# Patient Record
Sex: FEMALE | Race: WHITE | NOT HISPANIC OR LATINO | Employment: OTHER | URBAN - METROPOLITAN AREA
[De-identification: names, ages, dates, MRNs, and addresses within clinical notes are randomized per-mention and may not be internally consistent; named-entity substitution may affect disease eponyms.]

---

## 2023-04-11 ENCOUNTER — TELEPHONE (OUTPATIENT)
Dept: PRIMARY CARE | Facility: CLINIC | Age: 88
End: 2023-04-11
Payer: MEDICARE

## 2023-04-11 NOTE — TELEPHONE ENCOUNTER
Please call son regarding a letter he would like sent to [facility] so she can get benefits from her spouse due to the fact she has hearing loss in one ear and loss of vision in one eye. Please call

## 2023-05-08 ENCOUNTER — OFFICE VISIT (OUTPATIENT)
Dept: PRIMARY CARE | Facility: CLINIC | Age: 88
End: 2023-05-08
Payer: MEDICARE

## 2023-05-08 VITALS
DIASTOLIC BLOOD PRESSURE: 70 MMHG | OXYGEN SATURATION: 96 % | BODY MASS INDEX: 21.53 KG/M2 | RESPIRATION RATE: 16 BRPM | SYSTOLIC BLOOD PRESSURE: 130 MMHG | WEIGHT: 113 LBS | HEART RATE: 86 BPM | TEMPERATURE: 97.9 F

## 2023-05-08 DIAGNOSIS — F32.0 CURRENT MILD EPISODE OF MAJOR DEPRESSIVE DISORDER, UNSPECIFIED WHETHER RECURRENT (CMS-HCC): ICD-10-CM

## 2023-05-08 DIAGNOSIS — M35.3 PMR (POLYMYALGIA RHEUMATICA) (MULTI): ICD-10-CM

## 2023-05-08 DIAGNOSIS — Z93.9 HISTORY OF CREATION OF OSTOMY (MULTI): ICD-10-CM

## 2023-05-08 DIAGNOSIS — R05.2 SUBACUTE COUGH: Primary | ICD-10-CM

## 2023-05-08 DIAGNOSIS — E55.9 VITAMIN D DEFICIENCY: ICD-10-CM

## 2023-05-08 DIAGNOSIS — K51.919 ULCERATIVE COLITIS WITH COMPLICATION, UNSPECIFIED LOCATION (MULTI): ICD-10-CM

## 2023-05-08 DIAGNOSIS — E21.3 HYPERPARATHYROIDISM (MULTI): ICD-10-CM

## 2023-05-08 DIAGNOSIS — R53.83 OTHER FATIGUE: ICD-10-CM

## 2023-05-08 PROBLEM — H91.90 HEARING LOSS: Status: ACTIVE | Noted: 2023-05-08

## 2023-05-08 PROBLEM — I48.91 ATRIAL FIBRILLATION (MULTI): Status: RESOLVED | Noted: 2023-05-08 | Resolved: 2023-05-08

## 2023-05-08 PROBLEM — E53.8 VITAMIN B12 DEFICIENCY: Status: ACTIVE | Noted: 2023-05-08

## 2023-05-08 PROBLEM — H52.13 MYOPIA OF BOTH EYES: Status: ACTIVE | Noted: 2023-05-08

## 2023-05-08 PROBLEM — H54.62 VISION LOSS, LEFT EYE: Status: ACTIVE | Noted: 2023-05-08

## 2023-05-08 PROBLEM — H27.112 SUBLUXATION OF LEFT LENS: Status: ACTIVE | Noted: 2023-05-08

## 2023-05-08 PROBLEM — H35.372 EPIRETINAL MEMBRANE (ERM) OF LEFT EYE: Status: ACTIVE | Noted: 2023-05-08

## 2023-05-08 PROBLEM — R93.0 OPACIFICATION OF SPHENOID SINUS: Status: ACTIVE | Noted: 2023-05-08

## 2023-05-08 PROBLEM — H33.002 RHEGMATOGENOUS RETINAL DETACHMENT OF LEFT EYE: Status: ACTIVE | Noted: 2023-05-08

## 2023-05-08 PROBLEM — H90.3 BILATERAL HIGH FREQUENCY SENSORINEURAL HEARING LOSS: Status: ACTIVE | Noted: 2023-05-08

## 2023-05-08 PROBLEM — M25.561 ARTHRALGIA OF BOTH LOWER LEGS: Status: ACTIVE | Noted: 2023-05-08

## 2023-05-08 PROBLEM — M81.0 OSTEOPOROSIS: Status: ACTIVE | Noted: 2023-05-08

## 2023-05-08 PROBLEM — G44.229 CHRONIC TENSION HEADACHES: Status: ACTIVE | Noted: 2023-05-08

## 2023-05-08 PROBLEM — M54.2 NECK PAIN: Status: ACTIVE | Noted: 2023-05-08

## 2023-05-08 PROBLEM — H40.9 GLAUCOMA: Status: ACTIVE | Noted: 2023-05-08

## 2023-05-08 PROBLEM — R29.6 FALL IN ELDERLY PATIENT: Status: ACTIVE | Noted: 2023-05-08

## 2023-05-08 PROBLEM — H18.519 FUCHS' ENDOTHELIAL DYSTROPHY: Status: ACTIVE | Noted: 2023-05-08

## 2023-05-08 PROBLEM — H35.342 MACULAR HOLE, LEFT EYE: Status: ACTIVE | Noted: 2023-05-08

## 2023-05-08 PROBLEM — R26.89 UNSTABLE BALANCE: Status: ACTIVE | Noted: 2023-05-08

## 2023-05-08 PROBLEM — H17.9 CORNEAL SCARS, BOTH EYES: Status: ACTIVE | Noted: 2023-05-08

## 2023-05-08 PROBLEM — H52.11 MYOPIA OF RIGHT EYE: Status: ACTIVE | Noted: 2023-05-08

## 2023-05-08 PROBLEM — H93.13 TINNITUS OF BOTH EARS: Status: ACTIVE | Noted: 2023-05-08

## 2023-05-08 PROBLEM — H16.223 KERATOCONJUNCTIVITIS SICCA OF BOTH EYES NOT DUE TO SJOGREN'S SYNDROME: Status: ACTIVE | Noted: 2023-05-08

## 2023-05-08 PROBLEM — M99.09 SEGMENTAL AND SOMATIC DYSFUNCTION: Status: ACTIVE | Noted: 2023-05-08

## 2023-05-08 PROBLEM — M19.90 OSTEOARTHRITIS: Status: ACTIVE | Noted: 2023-05-08

## 2023-05-08 PROBLEM — I48.91 ATRIAL FIBRILLATION (MULTI): Status: ACTIVE | Noted: 2023-05-08

## 2023-05-08 PROBLEM — H02.889 MGD (MEIBOMIAN GLAND DYSFUNCTION): Status: ACTIVE | Noted: 2023-05-08

## 2023-05-08 PROBLEM — F32.A DEPRESSION: Status: ACTIVE | Noted: 2023-05-08

## 2023-05-08 PROBLEM — H47.291 PARTIAL OPTIC ATROPHY OF RIGHT EYE: Status: ACTIVE | Noted: 2023-05-08

## 2023-05-08 PROBLEM — H04.123 DRY EYE SYNDROME OF BILATERAL LACRIMAL GLANDS: Status: ACTIVE | Noted: 2023-05-08

## 2023-05-08 PROBLEM — H90.3 ASYMMETRICAL SENSORINEURAL HEARING LOSS: Status: ACTIVE | Noted: 2023-05-08

## 2023-05-08 PROBLEM — M53.9 MULTILEVEL DEGENERATIVE DISC DISEASE: Status: ACTIVE | Noted: 2023-05-08

## 2023-05-08 PROBLEM — H43.811 PVD (POSTERIOR VITREOUS DETACHMENT), RIGHT EYE: Status: ACTIVE | Noted: 2023-05-08

## 2023-05-08 PROBLEM — E78.5 HYPERLIPIDEMIA: Status: ACTIVE | Noted: 2023-05-08

## 2023-05-08 PROBLEM — J30.9 ALLERGIC RHINITIS: Status: ACTIVE | Noted: 2023-05-08

## 2023-05-08 PROBLEM — Z93.2 ILEOSTOMY IN PLACE (MULTI): Status: ACTIVE | Noted: 2023-05-08

## 2023-05-08 PROBLEM — M25.562 ARTHRALGIA OF BOTH LOWER LEGS: Status: ACTIVE | Noted: 2023-05-08

## 2023-05-08 PROBLEM — Z96.1 PSEUDOPHAKIA OF BOTH EYES: Status: ACTIVE | Noted: 2023-05-08

## 2023-05-08 PROBLEM — H93.19 TINNITUS: Status: ACTIVE | Noted: 2023-05-08

## 2023-05-08 PROBLEM — R35.0 URINARY FREQUENCY: Status: ACTIVE | Noted: 2023-05-08

## 2023-05-08 PROBLEM — M47.812 SPONDYLOSIS OF CERVICAL REGION WITHOUT MYELOPATHY OR RADICULOPATHY: Status: ACTIVE | Noted: 2023-05-08

## 2023-05-08 PROBLEM — H01.019: Status: ACTIVE | Noted: 2023-05-08

## 2023-05-08 PROBLEM — H18.12 BULLOUS KERATOPATHY, LEFT EYE: Status: ACTIVE | Noted: 2023-05-08

## 2023-05-08 PROBLEM — H40.10X2 ADVANCED OPEN-ANGLE GLAUCOMA, MODERATE STAGE: Status: ACTIVE | Noted: 2023-05-08

## 2023-05-08 PROBLEM — M25.50 JOINT ACHE: Status: ACTIVE | Noted: 2023-05-08

## 2023-05-08 PROBLEM — K21.9 ESOPHAGEAL REFLUX: Status: ACTIVE | Noted: 2023-05-08

## 2023-05-08 PROBLEM — H02.88A MEIBOMIAN GLAND DYSFUNCTION (MGD) OF UPPER AND LOWER EYELID OF RIGHT EYE: Status: ACTIVE | Noted: 2023-05-08

## 2023-05-08 PROBLEM — E83.52 HYPERCALCEMIA: Status: ACTIVE | Noted: 2023-05-08

## 2023-05-08 PROBLEM — H18.451 SALZMANN'S NODULAR DYSTROPHY OF RIGHT EYE: Status: ACTIVE | Noted: 2023-05-08

## 2023-05-08 PROBLEM — H02.88B MEIBOMIAN GLAND DYSFUNCTION (MGD) OF UPPER AND LOWER EYELID OF LEFT EYE: Status: ACTIVE | Noted: 2023-05-08

## 2023-05-08 PROCEDURE — 1160F RVW MEDS BY RX/DR IN RCRD: CPT | Performed by: INTERNAL MEDICINE

## 2023-05-08 PROCEDURE — 99214 OFFICE O/P EST MOD 30 MIN: CPT | Performed by: INTERNAL MEDICINE

## 2023-05-08 PROCEDURE — 1159F MED LIST DOCD IN RCRD: CPT | Performed by: INTERNAL MEDICINE

## 2023-05-08 PROCEDURE — 1036F TOBACCO NON-USER: CPT | Performed by: INTERNAL MEDICINE

## 2023-05-08 PROCEDURE — 1157F ADVNC CARE PLAN IN RCRD: CPT | Performed by: INTERNAL MEDICINE

## 2023-05-08 RX ORDER — FLUTICASONE PROPIONATE 50 MCG
1 SPRAY, SUSPENSION (ML) NASAL 2 TIMES DAILY
COMMUNITY
Start: 2019-01-24

## 2023-05-08 RX ORDER — CALCIUM CARBONATE/VITAMIN D3 600 MG-10
1 TABLET ORAL DAILY
COMMUNITY

## 2023-05-08 RX ORDER — OMEPRAZOLE 20 MG/1
1 CAPSULE, DELAYED RELEASE ORAL DAILY
Status: ON HOLD | COMMUNITY
Start: 2022-02-09 | End: 2024-02-07 | Stop reason: ENTERED-IN-ERROR

## 2023-05-08 RX ORDER — ESCITALOPRAM OXALATE 10 MG/1
10 TABLET ORAL
COMMUNITY
Start: 2013-10-03 | End: 2023-12-01 | Stop reason: SDUPTHER

## 2023-05-08 RX ORDER — LANOLIN ALCOHOL/MO/W.PET/CERES
1 CREAM (GRAM) TOPICAL DAILY
COMMUNITY
Start: 2022-03-22

## 2023-05-08 RX ORDER — MULTIVITAMIN
1 TABLET ORAL DAILY
COMMUNITY

## 2023-05-08 RX ORDER — ATENOLOL 25 MG/1
10 TABLET ORAL DAILY
Status: ON HOLD | COMMUNITY
End: 2024-02-07 | Stop reason: ENTERED-IN-ERROR

## 2023-05-08 ASSESSMENT — PATIENT HEALTH QUESTIONNAIRE - PHQ9
1. LITTLE INTEREST OR PLEASURE IN DOING THINGS: NOT AT ALL
2. FEELING DOWN, DEPRESSED OR HOPELESS: NOT AT ALL
SUM OF ALL RESPONSES TO PHQ9 QUESTIONS 1 AND 2: 0

## 2023-05-08 ASSESSMENT — ENCOUNTER SYMPTOMS
UNEXPECTED WEIGHT CHANGE: 1
COUGH: 1
ABDOMINAL PAIN: 1
MYALGIAS: 1
HEADACHES: 1
DIARRHEA: 1
FATIGUE: 1
BLOOD IN STOOL: 0
TROUBLE SWALLOWING: 1

## 2023-05-08 NOTE — PROGRESS NOTES
Patient here for a follow up    Subjective   Patient ID: Heather Villalba is a 89 y.o. female who presents for Follow-up.  She is accompanied by her son, Franklin, who offers some of the history.    The patient reports ongoing fatigue and finds herself sleeping 2 or 3 hours daily.  She has not been using her CPAP machine consistently, and has noticed headaches on the days the device was not used.  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    The patient has a longstanding ileostomy in place, and mentions recent onset of looser stools than the baseline despite occasional Benefiber supplement.  She denies any hematochezia.  She reports a weight loss of 5 lbs since 6/2022 despite eating more frequently.  The patient has been taking Boost and Ensure supplementation for additional nutrition as well.  The patient mentions proximal dysphagia since about 3/27/2023 as well as intermittent abdominal pain.    The patient reports a mildly productive cough with clear sputum since 3/2023.    The patient mentions urinary frequency.    The patient met with Otolaryngology for hearing impairment, and reports that the condition is unchanged.  It was recommended that the patient repeat an Audiogram in six months or so, and will be monitored until then per the note.    The patient mentions diffuse myalgia particularly in the neck and shoulders.  She has discontinued taking Alendronate 70 mg weekly due to concern about adverse effects.  She previously followed with  from Rheumatology and inquires if a smaller dosage is possible.       Review of Systems   Constitutional:  Positive for fatigue and unexpected weight change.   HENT:  Positive for hearing loss and trouble swallowing.    Respiratory:  Positive for cough.    Gastrointestinal:  Positive for abdominal pain and diarrhea. Negative for blood in stool.   Musculoskeletal:  Positive for myalgias.    Neurological:  Positive for headaches.     Objective   Physical Exam  Constitutional:       Appearance: Normal appearance.   Cardiovascular:      Rate and Rhythm: Normal rate and regular rhythm.      Heart sounds: Normal heart sounds.   Pulmonary:      Effort: Pulmonary effort is normal.      Breath sounds: Normal breath sounds.   Abdominal:      General: Bowel sounds are normal.      Palpations: Abdomen is soft.      Tenderness: There is abdominal tenderness.   Skin:     General: Skin is warm and dry.   Neurological:      General: No focal deficit present.      Mental Status: She is alert and oriented to person, place, and time. Mental status is at baseline.   Psychiatric:         Mood and Affect: Mood normal.         Behavior: Behavior normal.       Assessment/Plan   Problem List Items Addressed This Visit       Current mild episode of major depressive disorder (CMS/HCC)    Fatigue    History of creation of ostomy (CMS/HCC)    Hyperparathyroidism (CMS/HCC)     Other Visit Diagnoses       Subacute cough    -  Primary    Relevant Orders    XR chest 2 views            IMPRESSIONS/PLAN:      Subacute Cough  - Clear to auscultation bilaterally.  Ordered Chest Xray.    Loose Stools  -Advised patient to try Metamucil 1/2 teaspoons in a full glass of water once daily for one week to be increased to a full teaspoon thereafter.  Drink plenty of water to avoid further exacerbating symptoms.  Call the clinic if symptoms persist or worsen.    Fatigue  - Likely due to inconsistent CPAP, and advised patient to use device regularly.  Ordered CBC with differential, CMP, TSH, Vitamin D, and Vitamin B12.      /70 in the office today.     Neck Pain   - I have advised the patient it is okay to take Tylenol up to a maximum dosage of 3000 mg. Have recommended a regimen of two 325mg tablets in the morning and two tablets at night as needed. The patient will continue with physical therapy.     Depression   - Stable. Continue  escitalopram 10 mg QD.     Osteoporosis   - DEXA performed 2/1/2022 revealed T-score of -3.8 indicative of osteoporosis. Follows with Dr. Carranza. Previously on Alendronate 70 mg weekly.   I asked her to make a follow-up appointment with rheumatology to discuss her joint issues and her stopping of the alendronate.     Hypercalcemia   - Calcium returned to wnl of 10.3 per 5/2022 CMP. elevated at 10.4 per renal function panel 12/2021, slightly decreased from 10.8 - 9/2021. Follows up with Dr. Ulloa from General Surgery for parathyroid adenoma. Will monitor for now.      Health Maintenance   - Routine labs 5/2022.     Follow up in 3 months, call sooner if needed.       Scribe Attestation  By signing my name below, I, Dimas Chery   attest that this documentation has been prepared under the direction and in the presence of Jone Yo DO.

## 2023-05-18 ENCOUNTER — APPOINTMENT (OUTPATIENT)
Dept: PRIMARY CARE | Facility: CLINIC | Age: 88
End: 2023-05-18
Payer: MEDICARE

## 2023-05-26 ENCOUNTER — TELEPHONE (OUTPATIENT)
Dept: PRIMARY CARE | Facility: CLINIC | Age: 88
End: 2023-05-26
Payer: MEDICARE

## 2023-05-26 NOTE — TELEPHONE ENCOUNTER
Pt states she is being treated by Dr Yo and Dr Carranza. She is confused overall and states she has a health concern that she believes is being treated with Alendronate Sodium per Dr Carranza that Dr Douglas overlooks. She is using [facility] if anything gets sent in for her. Please advise.

## 2023-05-30 ENCOUNTER — LAB (OUTPATIENT)
Dept: LAB | Facility: LAB | Age: 88
End: 2023-05-30
Payer: MEDICARE

## 2023-05-30 DIAGNOSIS — E55.9 VITAMIN D DEFICIENCY: ICD-10-CM

## 2023-05-30 DIAGNOSIS — R53.83 OTHER FATIGUE: ICD-10-CM

## 2023-05-30 LAB
ALANINE AMINOTRANSFERASE (SGPT) (U/L) IN SER/PLAS: 14 U/L (ref 7–45)
ALBUMIN (G/DL) IN SER/PLAS: 4.1 G/DL (ref 3.4–5)
ALKALINE PHOSPHATASE (U/L) IN SER/PLAS: 45 U/L (ref 33–136)
ANION GAP IN SER/PLAS: 9 MMOL/L (ref 10–20)
ASPARTATE AMINOTRANSFERASE (SGOT) (U/L) IN SER/PLAS: 19 U/L (ref 9–39)
BASOPHILS (10*3/UL) IN BLOOD BY AUTOMATED COUNT: 0.03 X10E9/L (ref 0–0.1)
BASOPHILS/100 LEUKOCYTES IN BLOOD BY AUTOMATED COUNT: 0.6 % (ref 0–2)
BILIRUBIN TOTAL (MG/DL) IN SER/PLAS: 0.6 MG/DL (ref 0–1.2)
CALCIDIOL (25 OH VITAMIN D3) (NG/ML) IN SER/PLAS: 33 NG/ML
CALCIUM (MG/DL) IN SER/PLAS: 10.3 MG/DL (ref 8.6–10.3)
CARBON DIOXIDE, TOTAL (MMOL/L) IN SER/PLAS: 28 MMOL/L (ref 21–32)
CHLORIDE (MMOL/L) IN SER/PLAS: 104 MMOL/L (ref 98–107)
COBALAMIN (VITAMIN B12) (PG/ML) IN SER/PLAS: 2238 PG/ML (ref 211–911)
CREATININE (MG/DL) IN SER/PLAS: 0.96 MG/DL (ref 0.5–1.05)
EOSINOPHILS (10*3/UL) IN BLOOD BY AUTOMATED COUNT: 0.18 X10E9/L (ref 0–0.4)
EOSINOPHILS/100 LEUKOCYTES IN BLOOD BY AUTOMATED COUNT: 3.3 % (ref 0–6)
ERYTHROCYTE DISTRIBUTION WIDTH (RATIO) BY AUTOMATED COUNT: 13.5 % (ref 11.5–14.5)
ERYTHROCYTE MEAN CORPUSCULAR HEMOGLOBIN CONCENTRATION (G/DL) BY AUTOMATED: 30.6 G/DL (ref 32–36)
ERYTHROCYTE MEAN CORPUSCULAR VOLUME (FL) BY AUTOMATED COUNT: 101 FL (ref 80–100)
ERYTHROCYTES (10*6/UL) IN BLOOD BY AUTOMATED COUNT: 3.42 X10E12/L (ref 4–5.2)
GFR FEMALE: 55 ML/MIN/1.73M2
GLUCOSE (MG/DL) IN SER/PLAS: 97 MG/DL (ref 74–99)
HEMATOCRIT (%) IN BLOOD BY AUTOMATED COUNT: 34.6 % (ref 36–46)
HEMOGLOBIN (G/DL) IN BLOOD: 10.6 G/DL (ref 12–16)
IMMATURE GRANULOCYTES/100 LEUKOCYTES IN BLOOD BY AUTOMATED COUNT: 0.2 % (ref 0–0.9)
LEUKOCYTES (10*3/UL) IN BLOOD BY AUTOMATED COUNT: 5.4 X10E9/L (ref 4.4–11.3)
LYMPHOCYTES (10*3/UL) IN BLOOD BY AUTOMATED COUNT: 1.3 X10E9/L (ref 0.8–3)
LYMPHOCYTES/100 LEUKOCYTES IN BLOOD BY AUTOMATED COUNT: 24.2 % (ref 13–44)
MONOCYTES (10*3/UL) IN BLOOD BY AUTOMATED COUNT: 0.59 X10E9/L (ref 0.05–0.8)
MONOCYTES/100 LEUKOCYTES IN BLOOD BY AUTOMATED COUNT: 11 % (ref 2–10)
NEUTROPHILS (10*3/UL) IN BLOOD BY AUTOMATED COUNT: 3.27 X10E9/L (ref 1.6–5.5)
NEUTROPHILS/100 LEUKOCYTES IN BLOOD BY AUTOMATED COUNT: 60.7 % (ref 40–80)
PLATELETS (10*3/UL) IN BLOOD AUTOMATED COUNT: 170 X10E9/L (ref 150–450)
POTASSIUM (MMOL/L) IN SER/PLAS: 4.7 MMOL/L (ref 3.5–5.3)
PROTEIN TOTAL: 6.7 G/DL (ref 6.4–8.2)
SODIUM (MMOL/L) IN SER/PLAS: 136 MMOL/L (ref 136–145)
THYROTROPIN (MIU/L) IN SER/PLAS BY DETECTION LIMIT <= 0.05 MIU/L: 2.28 MIU/L (ref 0.44–3.98)
UREA NITROGEN (MG/DL) IN SER/PLAS: 28 MG/DL (ref 6–23)

## 2023-05-30 PROCEDURE — 80053 COMPREHEN METABOLIC PANEL: CPT

## 2023-05-30 PROCEDURE — 82607 VITAMIN B-12: CPT

## 2023-05-30 PROCEDURE — 85025 COMPLETE CBC W/AUTO DIFF WBC: CPT

## 2023-05-30 PROCEDURE — 84443 ASSAY THYROID STIM HORMONE: CPT

## 2023-05-30 PROCEDURE — 82306 VITAMIN D 25 HYDROXY: CPT

## 2023-05-30 PROCEDURE — 36415 COLL VENOUS BLD VENIPUNCTURE: CPT

## 2023-06-02 ENCOUNTER — APPOINTMENT (OUTPATIENT)
Dept: PRIMARY CARE | Facility: CLINIC | Age: 88
End: 2023-06-02
Payer: MEDICARE

## 2023-06-02 LAB
C REACTIVE PROTEIN (MG/L) IN SER/PLAS: 0.13 MG/DL
RHEUMATOID FACTOR (IU/ML) IN SERUM OR PLASMA: <10 IU/ML (ref 0–15)
SEDIMENTATION RATE, ERYTHROCYTE: 13 MM/H (ref 0–30)

## 2023-06-05 LAB
ANA PATTERN: ABNORMAL
ANA TITER: ABNORMAL
ANTI-CENTROMERE: <0.2 AI
ANTI-CHROMATIN: <0.2 AI
ANTI-DNA (DS): <1 IU/ML
ANTI-JO-1 IGG: <0.2 AI
ANTI-NUCLEAR ANTIBODY (ANA): POSITIVE
ANTI-RIBOSOMAL P: <0.2 AI
ANTI-RNP: <0.2 AI
ANTI-SCL-70: <0.2 AI
ANTI-SM/RNP: <0.2 AI
ANTI-SM: <0.2 AI
ANTI-SSA: <0.2 AI
ANTI-SSB: <0.2 AI
CITRULLINE ANTIBODY, IGG: 2 UNITS (ref 0–19)

## 2023-06-06 ENCOUNTER — TELEPHONE (OUTPATIENT)
Dept: PRIMARY CARE | Facility: CLINIC | Age: 88
End: 2023-06-06
Payer: MEDICARE

## 2023-06-06 LAB
ALBUMIN ELP: 4.3 G/DL (ref 3.4–5)
ALPHA 1: 0.3 G/DL (ref 0.2–0.6)
ALPHA 2: 0.7 G/DL (ref 0.4–1.1)
BETA: 0.7 G/DL (ref 0.5–1.2)
GAMMA GLOBULIN: 1 G/DL (ref 0.5–1.4)
PATH REVIEW-SERUM PROTEIN ELECTROPHORESIS: NORMAL
PROTEIN ELECTROPHORESIS INTERPRETATION: NORMAL
PROTEIN TOTAL: 7 G/DL (ref 6.4–8.2)

## 2023-06-06 NOTE — TELEPHONE ENCOUNTER
Reviewed- looks OK overall.  I can see her for follow-up and to review her labs with them if she would like.

## 2023-06-06 NOTE — TELEPHONE ENCOUNTER
Patient was at a baby shower Sunday - she tripped and fell and hit her head and elbow on a brick wall. She went to  for a brain scan and x-ray done. Ig you want to see the reports they may go to Dr Carranza due to her being her old PCP

## 2023-06-20 ENCOUNTER — APPOINTMENT (OUTPATIENT)
Dept: PRIMARY CARE | Facility: CLINIC | Age: 88
End: 2023-06-20
Payer: MEDICARE

## 2023-06-22 ENCOUNTER — APPOINTMENT (OUTPATIENT)
Dept: PRIMARY CARE | Facility: CLINIC | Age: 88
End: 2023-06-22
Payer: MEDICARE

## 2023-06-23 ENCOUNTER — OFFICE VISIT (OUTPATIENT)
Dept: PRIMARY CARE | Facility: CLINIC | Age: 88
End: 2023-06-23
Payer: MEDICARE

## 2023-06-23 ENCOUNTER — LAB (OUTPATIENT)
Dept: LAB | Facility: LAB | Age: 88
End: 2023-06-23
Payer: MEDICARE

## 2023-06-23 VITALS
DIASTOLIC BLOOD PRESSURE: 60 MMHG | BODY MASS INDEX: 20.96 KG/M2 | TEMPERATURE: 97 F | OXYGEN SATURATION: 96 % | HEART RATE: 82 BPM | WEIGHT: 110 LBS | SYSTOLIC BLOOD PRESSURE: 122 MMHG

## 2023-06-23 DIAGNOSIS — I73.9 PERIPHERAL VASCULAR DISEASE, UNSPECIFIED (CMS-HCC): ICD-10-CM

## 2023-06-23 DIAGNOSIS — D64.9 ANEMIA, UNSPECIFIED TYPE: Primary | ICD-10-CM

## 2023-06-23 DIAGNOSIS — D64.9 ANEMIA, UNSPECIFIED TYPE: ICD-10-CM

## 2023-06-23 PROBLEM — S09.90XA CLOSED HEAD INJURY WITHOUT LOSS OF CONSCIOUSNESS: Status: ACTIVE | Noted: 2023-06-04
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
## 2023-06-23 PROBLEM — S51.019A SKIN TEAR OF ELBOW WITHOUT COMPLICATION: Status: ACTIVE | Noted: 2023-06-04

## 2023-06-23 LAB
BASOPHILS (10*3/UL) IN BLOOD BY AUTOMATED COUNT: 0.03 X10E9/L (ref 0–0.1)
BASOPHILS/100 LEUKOCYTES IN BLOOD BY AUTOMATED COUNT: 0.7 % (ref 0–2)
EOSINOPHILS (10*3/UL) IN BLOOD BY AUTOMATED COUNT: 0.12 X10E9/L (ref 0–0.4)
EOSINOPHILS/100 LEUKOCYTES IN BLOOD BY AUTOMATED COUNT: 2.7 % (ref 0–6)
ERYTHROCYTE DISTRIBUTION WIDTH (RATIO) BY AUTOMATED COUNT: 13.3 % (ref 11.5–14.5)
ERYTHROCYTE MEAN CORPUSCULAR HEMOGLOBIN CONCENTRATION (G/DL) BY AUTOMATED: 30.7 G/DL (ref 32–36)
ERYTHROCYTE MEAN CORPUSCULAR VOLUME (FL) BY AUTOMATED COUNT: 100 FL (ref 80–100)
ERYTHROCYTES (10*6/UL) IN BLOOD BY AUTOMATED COUNT: 3.81 X10E12/L (ref 4–5.2)
FERRITIN (UG/LL) IN SER/PLAS: 57 UG/L (ref 8–150)
FOLATE (NG/ML) IN SER/PLAS: >22.3 NG/ML
HEMATOCRIT (%) IN BLOOD BY AUTOMATED COUNT: 38.1 % (ref 36–46)
HEMOGLOBIN (G/DL) IN BLOOD: 11.7 G/DL (ref 12–16)
IMMATURE GRANULOCYTES/100 LEUKOCYTES IN BLOOD BY AUTOMATED COUNT: 0.2 % (ref 0–0.9)
IRON (UG/DL) IN SER/PLAS: 88 UG/DL (ref 35–150)
IRON BINDING CAPACITY (UG/DL) IN SER/PLAS: 354 UG/DL (ref 240–445)
IRON SATURATION (%) IN SER/PLAS: 25 % (ref 25–45)
LEUKOCYTES (10*3/UL) IN BLOOD BY AUTOMATED COUNT: 4.5 X10E9/L (ref 4.4–11.3)
LYMPHOCYTES (10*3/UL) IN BLOOD BY AUTOMATED COUNT: 1.27 X10E9/L (ref 0.8–3)
LYMPHOCYTES/100 LEUKOCYTES IN BLOOD BY AUTOMATED COUNT: 28.5 % (ref 13–44)
MONOCYTES (10*3/UL) IN BLOOD BY AUTOMATED COUNT: 0.46 X10E9/L (ref 0.05–0.8)
MONOCYTES/100 LEUKOCYTES IN BLOOD BY AUTOMATED COUNT: 10.3 % (ref 2–10)
NEUTROPHILS (10*3/UL) IN BLOOD BY AUTOMATED COUNT: 2.57 X10E9/L (ref 1.6–5.5)
NEUTROPHILS/100 LEUKOCYTES IN BLOOD BY AUTOMATED COUNT: 57.6 % (ref 40–80)
PLATELETS (10*3/UL) IN BLOOD AUTOMATED COUNT: 177 X10E9/L (ref 150–450)

## 2023-06-23 PROCEDURE — 99214 OFFICE O/P EST MOD 30 MIN: CPT | Performed by: INTERNAL MEDICINE

## 2023-06-23 PROCEDURE — 1159F MED LIST DOCD IN RCRD: CPT | Performed by: INTERNAL MEDICINE

## 2023-06-23 PROCEDURE — 3078F DIAST BP <80 MM HG: CPT | Performed by: INTERNAL MEDICINE

## 2023-06-23 PROCEDURE — 1157F ADVNC CARE PLAN IN RCRD: CPT | Performed by: INTERNAL MEDICINE

## 2023-06-23 PROCEDURE — 83550 IRON BINDING TEST: CPT

## 2023-06-23 PROCEDURE — 1036F TOBACCO NON-USER: CPT | Performed by: INTERNAL MEDICINE

## 2023-06-23 PROCEDURE — 1160F RVW MEDS BY RX/DR IN RCRD: CPT | Performed by: INTERNAL MEDICINE

## 2023-06-23 PROCEDURE — 82746 ASSAY OF FOLIC ACID SERUM: CPT

## 2023-06-23 PROCEDURE — 3074F SYST BP LT 130 MM HG: CPT | Performed by: INTERNAL MEDICINE

## 2023-06-23 PROCEDURE — 36415 COLL VENOUS BLD VENIPUNCTURE: CPT

## 2023-06-23 PROCEDURE — 82728 ASSAY OF FERRITIN: CPT

## 2023-06-23 PROCEDURE — 85025 COMPLETE CBC W/AUTO DIFF WBC: CPT

## 2023-06-23 PROCEDURE — 83540 ASSAY OF IRON: CPT

## 2023-06-23 RX ORDER — MELATONIN 5 MG
1 CAPSULE ORAL NIGHTLY
COMMUNITY

## 2023-06-23 RX ORDER — NORTRIPTYLINE HYDROCHLORIDE 10 MG/1
10 CAPSULE ORAL NIGHTLY
COMMUNITY
Start: 2023-06-08 | End: 2023-11-28 | Stop reason: SDUPTHER

## 2023-06-23 RX ORDER — LOPERAMIDE HCL 2 MG
2 TABLET ORAL DAILY
COMMUNITY
End: 2023-07-31 | Stop reason: SDUPTHER

## 2023-06-23 RX ORDER — LATANOPROST 50 UG/ML
1 SOLUTION/ DROPS OPHTHALMIC NIGHTLY
COMMUNITY
End: 2024-06-03

## 2023-06-23 RX ORDER — CYCLOSPORINE 0.5 MG/ML
1 EMULSION OPHTHALMIC EVERY 12 HOURS
COMMUNITY
Start: 2022-06-25 | End: 2023-12-18 | Stop reason: SDUPTHER

## 2023-06-23 ASSESSMENT — ENCOUNTER SYMPTOMS
BLOOD IN STOOL: 0
APPETITE CHANGE: 0
UNEXPECTED WEIGHT CHANGE: 1
HEMATURIA: 0
HEADACHES: 1
FATIGUE: 1

## 2023-06-23 NOTE — PROGRESS NOTES
Subjective   Patient ID: Heather Villalba is a 89 y.o. female who presents for Follow-up (Blood work results, she fell into a wall hit her head, has steri strips on her  right arm,  follow up emergency room).  She is accompanied by her daughter, Dee, who offers some of the history.    The patient reports a mechanical fall on 6/8/2023 when she bumped into a guest a baby shower, and hit a brick wall before landing on her back.  She was taken to the ER at [facility] via ambulance, and was evaluated for a closed head injury and right forearm laceration.  A CT of the head was negative for any acute intracranial hemorrhage, and a CT of the cervical spine was negative for any acute fractures or traumatic malalignment.  She was discharged home the same day in stable condition with return to ER instructions.  She plans to use a walker when ambulating in crowded areas to prevent further falls.    The patient reports mild headache ongoing since before the fall in 6/2023.  Her daughter is concerned about mild cognitive deficit and notes memory issues that are new.  The patient mentions that the pain in the toe is ongoing and exacerbated by the fall.    The patient is fatigued despite using the CPAP device consistently and is sleeping 10-12 hours per day.  She also endorses cold intolerance.  Her last CBC showed a Hemoglobin of 10.6 g/dL in 6/2023.  The patient recalls receiving iron supplementation during childhood for anemia as well.  She denies any hematuria, melena, or hematochezia.  The patient notes ongoing loose stools.    The patient reports tender cervical lymph nodes.  She continues to follow with  from General Surgery for a history of parathyroid adenoma.    The patient is concerned about a weight loss of 6 lbs since 9/2022, and is 110 lbs in the clinic today.  She maintains a good appetite and eats sufficient protein with each meal.  Nevertheless, she is regularly missing meals due to prolonged  day time napping.    The patient is well supported by her daughter Dee, who is her healthcare power of .      Review of Systems   Constitutional:  Positive for fatigue and unexpected weight change. Negative for appetite change.   Gastrointestinal:  Negative for blood in stool.   Genitourinary:  Negative for hematuria.   Neurological:  Positive for headaches.     Objective   Physical Exam  Constitutional:       Appearance: Normal appearance.   Cardiovascular:      Rate and Rhythm: Normal rate and regular rhythm.      Heart sounds: Normal heart sounds.   Pulmonary:      Effort: Pulmonary effort is normal.      Breath sounds: Normal breath sounds.   Abdominal:      General: Bowel sounds are normal.      Palpations: Abdomen is soft.      Tenderness: There is no abdominal tenderness.   Skin:     General: Skin is warm and dry.   Neurological:      General: No focal deficit present.      Mental Status: She is alert and oriented to person, place, and time. Mental status is at baseline.   Psychiatric:         Mood and Affect: Mood normal.         Behavior: Behavior normal.       Assessment/Plan   Problem List Items Addressed This Visit       Peripheral vascular disease, unspecified (CMS/HCC)     Other Visit Diagnoses       Anemia, unspecified type    -  Primary    Relevant Orders    CBC and Auto Differential    Iron and TIBC    Ferritin    Folate            IMPRESSIONS/PLAN:      Anemia   - Ordered CBC with differential, Folate, Ferritin, Iron and TIBC.  Will likely try oral iron supplementation pending test results.     /60 in the office today.     Neck Pain   - I have advised the patient it is okay to take Tylenol up to a maximum dosage of 3000 mg. Have recommended a regimen of two 325mg tablets in the morning and two tablets at night as needed. The patient will continue with physical therapy.     Depression   - Stable. Continue escitalopram 10 mg QD.     Osteoporosis   - DEXA performed 2/1/2022 revealed  T-score of -3.8 indicative of osteoporosis. Follows with Dr. Carranza. Previously on Alendronate 70 mg weekly.   I asked her to make a follow-up appointment with rheumatology to discuss her joint issues and her stopping of the alendronate.     Hypercalcemia   - Calcium returned to wnl of 10.3 per 5/2022 CMP. elevated at 10.4 per renal function panel 12/2021, slightly decreased from 10.8 - 9/2021. Follows up with Dr. Ulloa from General Surgery for parathyroid adenoma. Will monitor for now.     Subacute Cough  - Clear to auscultation bilaterally.  Chest Xray showed emphysematous changes, with no acute abnormality 5/2023.     Loose Stools  -Advised patient to try Metamucil 1/2 teaspoons in a full glass of water once daily for one week to be increased to a full teaspoon thereafter.  Drink plenty of water to avoid further exacerbating symptoms.  Call the clinic if symptoms persist or worsen.     Fatigue  - Likely due to inconsistent CPAP, and advised patient to use device regularly.  Hb low at 10.6 g/dL per 5/2023.     Health Maintenance   - Routine labs 5/2023.     Follow up in 3 months, call sooner if needed.     Scribe Attestation  By signing my name below, I, Dimas Chery   attest that this documentation has been prepared under the direction and in the presence of Jone Yo DO.

## 2023-07-31 ENCOUNTER — TELEPHONE (OUTPATIENT)
Dept: PRIMARY CARE | Facility: CLINIC | Age: 88
End: 2023-07-31
Payer: MEDICARE

## 2023-07-31 DIAGNOSIS — Z00.00 HEALTHCARE MAINTENANCE: ICD-10-CM

## 2023-07-31 RX ORDER — LOPERAMIDE HCL 2 MG
2 TABLET ORAL DAILY
Qty: 420 TABLET | Refills: 0 | Status: SHIPPED | OUTPATIENT
Start: 2023-07-31 | End: 2024-02-07 | Stop reason: HOSPADM

## 2023-07-31 NOTE — TELEPHONE ENCOUNTER
Dr. Yo patient called and says that she noticed her Loperamide was dropped from her pharmacy list. Patient has been on this medication for about 20 years since having her ostomy procedure done.  Patient would like this renewed. Please advise. I will send rx request to you.

## 2023-09-08 ENCOUNTER — APPOINTMENT (OUTPATIENT)
Dept: PRIMARY CARE | Facility: CLINIC | Age: 88
End: 2023-09-08
Payer: MEDICARE

## 2023-09-11 DIAGNOSIS — Z00.00 HEALTHCARE MAINTENANCE: Primary | ICD-10-CM

## 2023-09-11 RX ORDER — LOPERAMIDE HYDROCHLORIDE 2 MG/1
CAPSULE ORAL
Qty: 630 CAPSULE | Refills: 1 | Status: SHIPPED | OUTPATIENT
Start: 2023-09-11

## 2023-10-06 ENCOUNTER — OFFICE VISIT (OUTPATIENT)
Dept: PRIMARY CARE | Facility: CLINIC | Age: 88
End: 2023-10-06
Payer: MEDICARE

## 2023-10-06 VITALS
OXYGEN SATURATION: 93 % | SYSTOLIC BLOOD PRESSURE: 138 MMHG | HEART RATE: 74 BPM | RESPIRATION RATE: 16 BRPM | DIASTOLIC BLOOD PRESSURE: 80 MMHG | BODY MASS INDEX: 21.15 KG/M2 | WEIGHT: 111 LBS | TEMPERATURE: 97.1 F

## 2023-10-06 DIAGNOSIS — Z23 ENCOUNTER FOR IMMUNIZATION: Primary | ICD-10-CM

## 2023-10-06 PROCEDURE — 3075F SYST BP GE 130 - 139MM HG: CPT | Performed by: INTERNAL MEDICINE

## 2023-10-06 PROCEDURE — 1036F TOBACCO NON-USER: CPT | Performed by: INTERNAL MEDICINE

## 2023-10-06 PROCEDURE — 99214 OFFICE O/P EST MOD 30 MIN: CPT | Performed by: INTERNAL MEDICINE

## 2023-10-06 PROCEDURE — G0008 ADMIN INFLUENZA VIRUS VAC: HCPCS | Performed by: INTERNAL MEDICINE

## 2023-10-06 PROCEDURE — 1160F RVW MEDS BY RX/DR IN RCRD: CPT | Performed by: INTERNAL MEDICINE

## 2023-10-06 PROCEDURE — 1159F MED LIST DOCD IN RCRD: CPT | Performed by: INTERNAL MEDICINE

## 2023-10-06 PROCEDURE — 3079F DIAST BP 80-89 MM HG: CPT | Performed by: INTERNAL MEDICINE

## 2023-10-06 PROCEDURE — 90662 IIV NO PRSV INCREASED AG IM: CPT | Performed by: INTERNAL MEDICINE

## 2023-10-06 ASSESSMENT — PATIENT HEALTH QUESTIONNAIRE - PHQ9
SUM OF ALL RESPONSES TO PHQ9 QUESTIONS 1 AND 2: 0
2. FEELING DOWN, DEPRESSED OR HOPELESS: NOT AT ALL
1. LITTLE INTEREST OR PLEASURE IN DOING THINGS: NOT AT ALL

## 2023-10-06 ASSESSMENT — ENCOUNTER SYMPTOMS
BLOOD IN STOOL: 0
NECK PAIN: 1
DIARRHEA: 1

## 2023-10-06 NOTE — PROGRESS NOTES
Patient here for GI issues     Subjective   Patient ID: Heather Villalba is a 89 y.o. female who presents for GI Problem.  She is accompanied by her daughter who offers some of the history.    The patient is grieving the loss of her  who passed away two weeks prior due to complications from bladder cancer.  She is adjusting as well as can be expected, and notes that she is well supported by her children.  She noticed that she has been sleeping longer and more soundly than usual, and believes this may be due to the stress.  The patient has been taking escitalopram 10 mg as two tablets recently, and feels this is helping.     The patient has been taking Imodium twice daily over the counter with partial benefit for loose stools.  She has not noticed any association of symptoms with timing of the day or specific foods.  The patient's weight has been relatively stable since 6/2023, as she has been increasing protein in her diet.  She denies any abdominal pain, blood in the stool, or change in ostomy output.    The patient has been undergoing physical therapy for neck pain, and is optimistic this will help in the future.  She is following with Neurology.    The patient mentions mild cognitive impairment after a fall and concussion.  She has difficulty with both short and long term memory, including remembering the names of medications and daily tasks.      GI Problem  The primary symptoms include diarrhea.     Review of Systems   Gastrointestinal:  Positive for diarrhea. Negative for blood in stool.   Musculoskeletal:  Positive for neck pain.   Neurological:         Positive for memory impairment.   Psychiatric/Behavioral:          Positive for grief reaction.     Objective   Physical Exam  Constitutional:       Appearance: Normal appearance.   Neck:      Vascular: No carotid bruit.   Cardiovascular:      Rate and Rhythm: Normal rate and regular rhythm.      Heart sounds: Normal heart sounds. No murmur  heard.  Pulmonary:      Effort: Pulmonary effort is normal.      Breath sounds: Normal breath sounds.   Abdominal:      General: Bowel sounds are normal.      Palpations: Abdomen is soft.      Tenderness: There is no abdominal tenderness.   Skin:     General: Skin is warm and dry.   Neurological:      General: No focal deficit present.      Mental Status: She is alert and oriented to person, place, and time. Mental status is at baseline.   Psychiatric:         Mood and Affect: Mood normal.         Behavior: Behavior normal.       Assessment/Plan   Problem List Items Addressed This Visit    None  Visit Diagnoses         Codes    Encounter for immunization    -  Primary Z23    Relevant Orders    Flu vaccine, quadrivalent, high-dose, preservative free, age 65y+ (FLUZONE) (Completed)            IMPRESSIONS/PLAN:      /80 in the office today.     Neck Pain   - I have advised the patient it is okay to take Tylenol up to a maximum dosage of 3000 mg. Have recommended a regimen of two 325mg tablets in the morning and two tablets at night as needed. The patient will continue with physical therapy.     Depression   - Stable. Patient taking escitalopram 10 mg two tablets every day during the grieving process, and is okay to continue with this regimen for now.  Will re-evaluate when she is feeling better.     Osteoporosis   - DEXA performed 2/1/2022 revealed T-score of -3.8 indicative of osteoporosis. Follows with Dr. Carranza. Previously on Alendronate 70 mg weekly.   I asked her to make a follow-up appointment with rheumatology to discuss her joint issues and her stopping of the alendronate.     Hypercalcemia   - Calcium returned to wnl of 10.3 per 5/2022 CMP. elevated at 10.4 per renal function panel 12/2021, slightly decreased from 10.8 - 9/2021. Follows up with Dr. Ulloa from General Surgery for parathyroid adenoma. Will monitor for now.      Subacute Cough  - Clear to auscultation bilaterally.  Chest Xray showed  emphysematous changes, with no acute abnormality 5/2023.     Loose Stools  -Advised patient to try Metamucil 1/2 teaspoons in a full glass of water once daily for one week to be increased to a full teaspoon thereafter.  Drink plenty of water to avoid further exacerbating symptoms.  Call the clinic if symptoms persist or worsen.     Fatigue  - Likely due to inconsistent CPAP, and advised patient to use device regularly.  Hb low at 10.6 g/dL per 5/2023.     Anemia   - Last Hb 11.7 per 6/2023, trending up.  Folate, Ferritin, Iron and TIBC all wnl.     Health Maintenance   - Routine labs 5/2023.     Follow up in 3 months, call sooner if needed.     Scribe Attestation  By signing my name below, I, Dimas Chery   attest that this documentation has been prepared under the direction and in the presence of Jone Yo DO.

## 2023-11-17 ENCOUNTER — APPOINTMENT (OUTPATIENT)
Dept: PRIMARY CARE | Facility: CLINIC | Age: 88
End: 2023-11-17
Payer: MEDICARE

## 2023-11-28 DIAGNOSIS — G47.00 INSOMNIA, UNSPECIFIED TYPE: Primary | ICD-10-CM

## 2023-11-28 RX ORDER — NORTRIPTYLINE HYDROCHLORIDE 10 MG/1
10 CAPSULE ORAL NIGHTLY
Qty: 90 CAPSULE | Refills: 2 | Status: SHIPPED | OUTPATIENT
Start: 2023-11-28 | End: 2024-03-20 | Stop reason: SDUPTHER

## 2023-11-28 NOTE — TELEPHONE ENCOUNTER
Patients son is asking if patient can take 1 tbalet or 2 tablets of lexapro- patient is having a hard time with cutting the medication in half for the 1.5 tablets daily. Imodium she is taking 7 pills a day this is very expensive. Can she try some fiber instead?

## 2023-11-28 NOTE — TELEPHONE ENCOUNTER
Would do 1 tablet of Lexapro (10 mg).  Can try fiber but would only do a small amount- 1/2 tsp daily in a large glass of water.

## 2023-12-01 DIAGNOSIS — F32.A DEPRESSION, UNSPECIFIED DEPRESSION TYPE: Primary | ICD-10-CM

## 2023-12-01 RX ORDER — ESCITALOPRAM OXALATE 10 MG/1
10 TABLET ORAL
Qty: 90 TABLET | Refills: 3 | Status: SHIPPED | OUTPATIENT
Start: 2023-12-01 | End: 2023-12-15 | Stop reason: SDUPTHER

## 2023-12-05 ENCOUNTER — TELEPHONE (OUTPATIENT)
Dept: PRIMARY CARE | Facility: CLINIC | Age: 88
End: 2023-12-05
Payer: MEDICARE

## 2023-12-05 NOTE — TELEPHONE ENCOUNTER
Pt called needing advise about her vision is bad and getting worse. Pt stated has 3 eye doctors  Dr Lou, Dr Pereira, can't remember the 3rd one., possible Dr Casey.     She doesn't know which one she should make an appointment with?    Please call pt at [phone]

## 2023-12-07 NOTE — TELEPHONE ENCOUNTER
Talked to the patients daughter. They will call Dr Lou office to discuss the issue to see if the patient may need to be seen sooner if her eyes are getting worse.

## 2023-12-15 DIAGNOSIS — F32.A DEPRESSION, UNSPECIFIED DEPRESSION TYPE: ICD-10-CM

## 2023-12-15 RX ORDER — ESCITALOPRAM OXALATE 10 MG/1
10 TABLET ORAL DAILY
Qty: 90 TABLET | Refills: 3 | Status: SHIPPED | OUTPATIENT
Start: 2023-12-15

## 2023-12-18 DIAGNOSIS — H16.223 KERATOCONJUNCTIVITIS SICCA OF BOTH EYES NOT DUE TO SJOGREN'S SYNDROME: Primary | ICD-10-CM

## 2023-12-19 RX ORDER — CYCLOSPORINE 0.5 MG/ML
1 EMULSION OPHTHALMIC EVERY 12 HOURS
Qty: 60 EACH | Refills: 1 | Status: SHIPPED | OUTPATIENT
Start: 2023-12-19 | End: 2024-01-31

## 2024-01-15 ENCOUNTER — OFFICE VISIT (OUTPATIENT)
Dept: OPHTHALMOLOGY | Facility: CLINIC | Age: 89
End: 2024-01-15
Payer: MEDICARE

## 2024-01-15 DIAGNOSIS — H35.372 EPIRETINAL MEMBRANE (ERM) OF LEFT EYE: Primary | ICD-10-CM

## 2024-01-15 PROCEDURE — 92134 CPTRZ OPH DX IMG PST SGM RTA: CPT | Mod: BILATERAL PROCEDURE | Performed by: OPHTHALMOLOGY

## 2024-01-15 PROCEDURE — 99213 OFFICE O/P EST LOW 20 MIN: CPT | Performed by: OPHTHALMOLOGY

## 2024-01-15 ASSESSMENT — ENCOUNTER SYMPTOMS
ROS GI COMMENTS: ILEOSTOMY
GASTROINTESTINAL NEGATIVE: 1

## 2024-01-15 ASSESSMENT — PACHYMETRY
OD_CT(UM): 530
OS_CT(UM): 584

## 2024-01-15 ASSESSMENT — EXTERNAL EXAM - RIGHT EYE: OD_EXAM: NORMAL

## 2024-01-15 ASSESSMENT — VISUAL ACUITY
OD_CC: 20/30
CORRECTION_TYPE: GLASSES
METHOD: SNELLEN - LINEAR
OS_CC: NLP

## 2024-01-15 ASSESSMENT — TONOMETRY
IOP_METHOD: GOLDMANN APPLANATION
OD_IOP_MMHG: 16
OS_IOP_MMHG: 12

## 2024-01-15 ASSESSMENT — CONF VISUAL FIELD
OS_SUPERIOR_NASAL_RESTRICTION: 2
OS_INFERIOR_TEMPORAL_RESTRICTION: 2
OS_SUPERIOR_TEMPORAL_RESTRICTION: 2
OS_INFERIOR_NASAL_RESTRICTION: 2

## 2024-01-15 ASSESSMENT — EXTERNAL EXAM - LEFT EYE: OS_EXAM: NORMAL

## 2024-01-15 ASSESSMENT — CUP TO DISC RATIO: OD_RATIO: .3

## 2024-01-15 ASSESSMENT — SLIT LAMP EXAM - LIDS
COMMENTS: GOOD POSITION, 1+ MGD
COMMENTS: GOOD POSITION, 1+ MGD

## 2024-01-15 NOTE — PROGRESS NOTES
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 Macular hole, left eye-Stable     9 H18.51 Fuchs' endothelial dystrophy-Stable     10 H27.112 Subluxation Of Left Lens-Stable     11 H17.9 Corneal Scars, Both Eyes-Stable     12 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     13 H52.11 Myopia of right eye-New     14 H52.4 Presbyopia-Stable     15 H52.221 Regular astigmatism, right eye-Stable               1 Amd chroidal atrophy OD          2 RRD OS old fixed  np value in surgery            3 Optic nerve pallor OD this may be sec to the  choroidal degeneration                     Hi quality OCT  scans obtained     signal good          OCT OD - Normal Foveal Contour, No Edema, IS/OS Junction Normal     OCT OS -            additional commnents: myopic c thin choroid               Discussion      This patient dose not have depth of viusal perception for the ileostomy manipulation   she will benefit form nursing help at home for this purpose  (Note amended for: umberto for exam on: )            Plan          [employer] nursing help for ilerostomy for hygiene resons     f/u 6m for retina

## 2024-02-06 ENCOUNTER — HOSPITAL ENCOUNTER (OUTPATIENT)
Facility: HOSPITAL | Age: 89
Setting detail: OBSERVATION
Discharge: NURSING, REHAB OR LONG-TERM CARE | End: 2024-02-07
Attending: EMERGENCY MEDICINE | Admitting: STUDENT IN AN ORGANIZED HEALTH CARE EDUCATION/TRAINING PROGRAM
Payer: MEDICARE

## 2024-02-06 ENCOUNTER — APPOINTMENT (OUTPATIENT)
Dept: RADIOLOGY | Facility: HOSPITAL | Age: 89
End: 2024-02-06
Payer: MEDICARE

## 2024-02-06 DIAGNOSIS — Z86.73 STROKE, RECENT, WITHOUT LATE EFFECT: ICD-10-CM

## 2024-02-06 DIAGNOSIS — R31.9 URINARY TRACT INFECTION WITH HEMATURIA, SITE UNSPECIFIED: ICD-10-CM

## 2024-02-06 DIAGNOSIS — I73.9 PERIPHERAL VASCULAR DISEASE, UNSPECIFIED (CMS-HCC): ICD-10-CM

## 2024-02-06 DIAGNOSIS — R26.89 UNSTABLE BALANCE: Primary | ICD-10-CM

## 2024-02-06 DIAGNOSIS — R26.81 UNSTEADY GAIT: ICD-10-CM

## 2024-02-06 DIAGNOSIS — N39.0 URINARY TRACT INFECTION WITH HEMATURIA, SITE UNSPECIFIED: ICD-10-CM

## 2024-02-06 LAB
ALBUMIN SERPL BCP-MCNC: 4.2 G/DL (ref 3.4–5)
ALP SERPL-CCNC: 49 U/L (ref 33–136)
ALT SERPL W P-5'-P-CCNC: 11 U/L (ref 7–45)
ANION GAP SERPL CALC-SCNC: 10 MMOL/L (ref 10–20)
APPEARANCE UR: ABNORMAL
AST SERPL W P-5'-P-CCNC: 20 U/L (ref 9–39)
BILIRUB SERPL-MCNC: 0.4 MG/DL (ref 0–1.2)
BILIRUB UR STRIP.AUTO-MCNC: NEGATIVE MG/DL
BUN SERPL-MCNC: 34 MG/DL (ref 6–23)
CALCIUM SERPL-MCNC: 10.5 MG/DL (ref 8.6–10.3)
CARDIAC TROPONIN I PNL SERPL HS: 15 NG/L (ref 0–13)
CHLORIDE SERPL-SCNC: 102 MMOL/L (ref 98–107)
CO2 SERPL-SCNC: 25 MMOL/L (ref 21–32)
COLOR UR: YELLOW
CREAT SERPL-MCNC: 1.02 MG/DL (ref 0.5–1.05)
EGFRCR SERPLBLD CKD-EPI 2021: 51 ML/MIN/1.73M*2
ERYTHROCYTE [DISTWIDTH] IN BLOOD BY AUTOMATED COUNT: 13 % (ref 11.5–14.5)
GLUCOSE SERPL-MCNC: 123 MG/DL (ref 74–99)
GLUCOSE UR STRIP.AUTO-MCNC: NEGATIVE MG/DL
HCT VFR BLD AUTO: 35.8 % (ref 36–46)
HGB BLD-MCNC: 11.4 G/DL (ref 12–16)
KETONES UR STRIP.AUTO-MCNC: NEGATIVE MG/DL
LEUKOCYTE ESTERASE UR QL STRIP.AUTO: ABNORMAL
MCH RBC QN AUTO: 30.6 PG (ref 26–34)
MCHC RBC AUTO-ENTMCNC: 31.8 G/DL (ref 32–36)
MCV RBC AUTO: 96 FL (ref 80–100)
NITRITE UR QL STRIP.AUTO: NEGATIVE
NRBC BLD-RTO: 0 /100 WBCS (ref 0–0)
PH UR STRIP.AUTO: 5 [PH]
PLATELET # BLD AUTO: 186 X10*3/UL (ref 150–450)
POTASSIUM SERPL-SCNC: 4.2 MMOL/L (ref 3.5–5.3)
PROT SERPL-MCNC: 6.6 G/DL (ref 6.4–8.2)
PROT UR STRIP.AUTO-MCNC: NEGATIVE MG/DL
RBC # BLD AUTO: 3.72 X10*6/UL (ref 4–5.2)
RBC # UR STRIP.AUTO: ABNORMAL /UL
RBC #/AREA URNS AUTO: >20 /HPF
SODIUM SERPL-SCNC: 133 MMOL/L (ref 136–145)
SP GR UR STRIP.AUTO: 1.01
UROBILINOGEN UR STRIP.AUTO-MCNC: <2 MG/DL
WBC # BLD AUTO: 5.9 X10*3/UL (ref 4.4–11.3)
WBC #/AREA URNS AUTO: ABNORMAL /HPF

## 2024-02-06 PROCEDURE — 81001 URINALYSIS AUTO W/SCOPE: CPT | Performed by: EMERGENCY MEDICINE

## 2024-02-06 PROCEDURE — 96365 THER/PROPH/DIAG IV INF INIT: CPT

## 2024-02-06 PROCEDURE — 70450 CT HEAD/BRAIN W/O DYE: CPT

## 2024-02-06 PROCEDURE — 85027 COMPLETE CBC AUTOMATED: CPT | Performed by: EMERGENCY MEDICINE

## 2024-02-06 PROCEDURE — 83880 ASSAY OF NATRIURETIC PEPTIDE: CPT

## 2024-02-06 PROCEDURE — 99285 EMERGENCY DEPT VISIT HI MDM: CPT | Mod: 25 | Performed by: EMERGENCY MEDICINE

## 2024-02-06 PROCEDURE — 70450 CT HEAD/BRAIN W/O DYE: CPT | Performed by: RADIOLOGY

## 2024-02-06 PROCEDURE — 84484 ASSAY OF TROPONIN QUANT: CPT | Performed by: STUDENT IN AN ORGANIZED HEALTH CARE EDUCATION/TRAINING PROGRAM

## 2024-02-06 PROCEDURE — 84484 ASSAY OF TROPONIN QUANT: CPT | Performed by: EMERGENCY MEDICINE

## 2024-02-06 PROCEDURE — 85027 COMPLETE CBC AUTOMATED: CPT | Performed by: STUDENT IN AN ORGANIZED HEALTH CARE EDUCATION/TRAINING PROGRAM

## 2024-02-06 PROCEDURE — 36415 COLL VENOUS BLD VENIPUNCTURE: CPT | Performed by: STUDENT IN AN ORGANIZED HEALTH CARE EDUCATION/TRAINING PROGRAM

## 2024-02-06 PROCEDURE — 87086 URINE CULTURE/COLONY COUNT: CPT | Mod: STJLAB | Performed by: EMERGENCY MEDICINE

## 2024-02-06 PROCEDURE — 83036 HEMOGLOBIN GLYCOSYLATED A1C: CPT | Mod: STJLAB

## 2024-02-06 PROCEDURE — 80053 COMPREHEN METABOLIC PANEL: CPT | Performed by: STUDENT IN AN ORGANIZED HEALTH CARE EDUCATION/TRAINING PROGRAM

## 2024-02-06 PROCEDURE — 80061 LIPID PANEL: CPT

## 2024-02-06 PROCEDURE — 80053 COMPREHEN METABOLIC PANEL: CPT | Performed by: EMERGENCY MEDICINE

## 2024-02-06 ASSESSMENT — COLUMBIA-SUICIDE SEVERITY RATING SCALE - C-SSRS
6. HAVE YOU EVER DONE ANYTHING, STARTED TO DO ANYTHING, OR PREPARED TO DO ANYTHING TO END YOUR LIFE?: NO
1. IN THE PAST MONTH, HAVE YOU WISHED YOU WERE DEAD OR WISHED YOU COULD GO TO SLEEP AND NOT WAKE UP?: NO
2. HAVE YOU ACTUALLY HAD ANY THOUGHTS OF KILLING YOURSELF?: NO

## 2024-02-06 ASSESSMENT — PAIN SCALES - GENERAL
PAINLEVEL_OUTOF10: 1
PAINLEVEL_OUTOF10: 0 - NO PAIN
PAINLEVEL_OUTOF10: 0 - NO PAIN

## 2024-02-06 ASSESSMENT — LIFESTYLE VARIABLES
EVER HAD A DRINK FIRST THING IN THE MORNING TO STEADY YOUR NERVES TO GET RID OF A HANGOVER: NO
HAVE PEOPLE ANNOYED YOU BY CRITICIZING YOUR DRINKING: NO
EVER FELT BAD OR GUILTY ABOUT YOUR DRINKING: NO
HAVE YOU EVER FELT YOU SHOULD CUT DOWN ON YOUR DRINKING: NO

## 2024-02-06 ASSESSMENT — PAIN - FUNCTIONAL ASSESSMENT: PAIN_FUNCTIONAL_ASSESSMENT: 0-10

## 2024-02-07 ENCOUNTER — APPOINTMENT (OUTPATIENT)
Dept: RADIOLOGY | Facility: HOSPITAL | Age: 89
End: 2024-02-07
Payer: MEDICARE

## 2024-02-07 ENCOUNTER — APPOINTMENT (OUTPATIENT)
Dept: CARDIOLOGY | Facility: HOSPITAL | Age: 89
End: 2024-02-07
Payer: MEDICARE

## 2024-02-07 VITALS
BODY MASS INDEX: 19.14 KG/M2 | HEIGHT: 63 IN | HEART RATE: 88 BPM | DIASTOLIC BLOOD PRESSURE: 65 MMHG | SYSTOLIC BLOOD PRESSURE: 140 MMHG | RESPIRATION RATE: 17 BRPM | OXYGEN SATURATION: 95 % | TEMPERATURE: 97.5 F | WEIGHT: 108 LBS

## 2024-02-07 PROBLEM — Z86.73 STROKE, RECENT, WITHOUT LATE EFFECT: Status: ACTIVE | Noted: 2024-02-07

## 2024-02-07 LAB
BNP SERPL-MCNC: 116 PG/ML (ref 0–99)
CHOLEST SERPL-MCNC: 163 MG/DL (ref 0–199)
CHOLESTEROL/HDL RATIO: 3.4
EST. AVERAGE GLUCOSE BLD GHB EST-MCNC: 120 MG/DL
GLUCOSE BLD MANUAL STRIP-MCNC: 115 MG/DL (ref 74–99)
HBA1C MFR BLD: 5.8 %
HDLC SERPL-MCNC: 47.9 MG/DL
HOLD SPECIMEN: NORMAL
LDLC SERPL CALC-MCNC: 76 MG/DL
NON HDL CHOLESTEROL: 115 MG/DL (ref 0–149)
TRIGL SERPL-MCNC: 194 MG/DL (ref 0–149)
VLDL: 39 MG/DL (ref 0–40)

## 2024-02-07 PROCEDURE — 2500000004 HC RX 250 GENERAL PHARMACY W/ HCPCS (ALT 636 FOR OP/ED)

## 2024-02-07 PROCEDURE — 2500000001 HC RX 250 WO HCPCS SELF ADMINISTERED DRUGS (ALT 637 FOR MEDICARE OP)

## 2024-02-07 PROCEDURE — 70551 MRI BRAIN STEM W/O DYE: CPT

## 2024-02-07 PROCEDURE — 2500000004 HC RX 250 GENERAL PHARMACY W/ HCPCS (ALT 636 FOR OP/ED): Performed by: EMERGENCY MEDICINE

## 2024-02-07 PROCEDURE — 2500000001 HC RX 250 WO HCPCS SELF ADMINISTERED DRUGS (ALT 637 FOR MEDICARE OP): Performed by: STUDENT IN AN ORGANIZED HEALTH CARE EDUCATION/TRAINING PROGRAM

## 2024-02-07 PROCEDURE — 99223 1ST HOSP IP/OBS HIGH 75: CPT | Performed by: PSYCHIATRY & NEUROLOGY

## 2024-02-07 PROCEDURE — G0378 HOSPITAL OBSERVATION PER HR: HCPCS

## 2024-02-07 PROCEDURE — 99236 HOSP IP/OBS SAME DATE HI 85: CPT | Performed by: STUDENT IN AN ORGANIZED HEALTH CARE EDUCATION/TRAINING PROGRAM

## 2024-02-07 PROCEDURE — 93306 TTE W/DOPPLER COMPLETE: CPT

## 2024-02-07 PROCEDURE — 96361 HYDRATE IV INFUSION ADD-ON: CPT

## 2024-02-07 PROCEDURE — 82947 ASSAY GLUCOSE BLOOD QUANT: CPT

## 2024-02-07 PROCEDURE — 70551 MRI BRAIN STEM W/O DYE: CPT | Performed by: RADIOLOGY

## 2024-02-07 RX ORDER — POLYETHYLENE GLYCOL 3350 17 G/17G
17 POWDER, FOR SOLUTION ORAL DAILY PRN
Status: DISCONTINUED | OUTPATIENT
Start: 2024-02-07 | End: 2024-02-07 | Stop reason: HOSPADM

## 2024-02-07 RX ORDER — LABETALOL HYDROCHLORIDE 5 MG/ML
10 INJECTION, SOLUTION INTRAVENOUS EVERY 10 MIN PRN
Status: DISCONTINUED | OUTPATIENT
Start: 2024-02-07 | End: 2024-02-07 | Stop reason: HOSPADM

## 2024-02-07 RX ORDER — ASPIRIN 81 MG/1
81 TABLET ORAL DAILY
Status: DISCONTINUED | OUTPATIENT
Start: 2024-02-07 | End: 2024-02-07

## 2024-02-07 RX ORDER — ENOXAPARIN SODIUM 100 MG/ML
30 INJECTION SUBCUTANEOUS
Status: DISCONTINUED | OUTPATIENT
Start: 2024-02-07 | End: 2024-02-07 | Stop reason: HOSPADM

## 2024-02-07 RX ORDER — CEFTRIAXONE 1 G/50ML
1 INJECTION, SOLUTION INTRAVENOUS EVERY 24 HOURS
Status: DISCONTINUED | OUTPATIENT
Start: 2024-02-07 | End: 2024-02-07

## 2024-02-07 RX ORDER — ASPIRIN 81 MG/1
81 TABLET ORAL DAILY
Status: DISCONTINUED | OUTPATIENT
Start: 2024-02-08 | End: 2024-02-07 | Stop reason: HOSPADM

## 2024-02-07 RX ORDER — ASPIRIN 81 MG/1
81 TABLET ORAL DAILY
Qty: 30 TABLET | Refills: 0
Start: 2024-02-08 | End: 2024-03-09

## 2024-02-07 RX ORDER — HYDRALAZINE HYDROCHLORIDE 25 MG/1
25 TABLET, FILM COATED ORAL EVERY 6 HOURS PRN
Status: DISCONTINUED | OUTPATIENT
Start: 2024-02-09 | End: 2024-02-07 | Stop reason: HOSPADM

## 2024-02-07 RX ORDER — ASPIRIN 325 MG
325 TABLET, DELAYED RELEASE (ENTERIC COATED) ORAL ONCE
Status: COMPLETED | OUTPATIENT
Start: 2024-02-07 | End: 2024-02-07

## 2024-02-07 RX ORDER — DOXYCYCLINE 100 MG/1
100 CAPSULE ORAL EVERY 12 HOURS SCHEDULED
Qty: 8 CAPSULE | Refills: 0
Start: 2024-02-07 | End: 2024-02-08 | Stop reason: SDUPTHER

## 2024-02-07 RX ORDER — DOXYCYCLINE 100 MG/1
100 CAPSULE ORAL EVERY 12 HOURS SCHEDULED
Qty: 9 CAPSULE | Refills: 0
Start: 2024-02-07 | End: 2024-02-07 | Stop reason: SDUPTHER

## 2024-02-07 RX ORDER — ATORVASTATIN CALCIUM 80 MG/1
80 TABLET, FILM COATED ORAL DAILY
Status: DISCONTINUED | OUTPATIENT
Start: 2024-02-07 | End: 2024-02-07 | Stop reason: HOSPADM

## 2024-02-07 RX ORDER — DOXYCYCLINE 100 MG/1
100 CAPSULE ORAL EVERY 12 HOURS SCHEDULED
Status: DISCONTINUED | OUTPATIENT
Start: 2024-02-07 | End: 2024-02-07 | Stop reason: HOSPADM

## 2024-02-07 RX ORDER — CEFTRIAXONE 1 G/50ML
1 INJECTION, SOLUTION INTRAVENOUS ONCE
Status: COMPLETED | OUTPATIENT
Start: 2024-02-07 | End: 2024-02-07

## 2024-02-07 RX ORDER — DOXYCYCLINE 100 MG/1
100 CAPSULE ORAL EVERY 12 HOURS SCHEDULED
Status: DISCONTINUED | OUTPATIENT
Start: 2024-02-07 | End: 2024-02-07

## 2024-02-07 RX ORDER — HYDRALAZINE HYDROCHLORIDE 20 MG/ML
10 INJECTION INTRAMUSCULAR; INTRAVENOUS
Status: DISCONTINUED | OUTPATIENT
Start: 2024-02-07 | End: 2024-02-07 | Stop reason: HOSPADM

## 2024-02-07 RX ADMIN — SODIUM CHLORIDE 1000 ML: 9 INJECTION, SOLUTION INTRAVENOUS at 11:16

## 2024-02-07 RX ADMIN — ATORVASTATIN CALCIUM 80 MG: 80 TABLET, FILM COATED ORAL at 11:19

## 2024-02-07 RX ADMIN — CEFTRIAXONE SODIUM 1 G: 1 INJECTION, SOLUTION INTRAVENOUS at 03:51

## 2024-02-07 RX ADMIN — ASPIRIN 325 MG: 325 TABLET, COATED ORAL at 06:23

## 2024-02-07 RX ADMIN — DOXYCYCLINE HYCLATE 100 MG: 100 CAPSULE ORAL at 11:24

## 2024-02-07 RX ADMIN — ENOXAPARIN SODIUM 30 MG: 30 INJECTION SUBCUTANEOUS at 11:18

## 2024-02-07 SDOH — SOCIAL STABILITY: SOCIAL INSECURITY: DO YOU FEEL UNSAFE GOING BACK TO THE PLACE WHERE YOU ARE LIVING?: NO

## 2024-02-07 SDOH — SOCIAL STABILITY: SOCIAL INSECURITY: ABUSE: ADULT

## 2024-02-07 SDOH — SOCIAL STABILITY: SOCIAL INSECURITY: HAS ANYONE EVER THREATENED TO HURT YOUR FAMILY OR YOUR PETS?: NO

## 2024-02-07 SDOH — SOCIAL STABILITY: SOCIAL INSECURITY: ARE THERE ANY APPARENT SIGNS OF INJURIES/BEHAVIORS THAT COULD BE RELATED TO ABUSE/NEGLECT?: NO

## 2024-02-07 SDOH — SOCIAL STABILITY: SOCIAL INSECURITY: DOES ANYONE TRY TO KEEP YOU FROM HAVING/CONTACTING OTHER FRIENDS OR DOING THINGS OUTSIDE YOUR HOME?: NO

## 2024-02-07 SDOH — SOCIAL STABILITY: SOCIAL INSECURITY: DO YOU FEEL ANYONE HAS EXPLOITED OR TAKEN ADVANTAGE OF YOU FINANCIALLY OR OF YOUR PERSONAL PROPERTY?: NO

## 2024-02-07 SDOH — SOCIAL STABILITY: SOCIAL INSECURITY: HAVE YOU HAD THOUGHTS OF HARMING ANYONE ELSE?: NO

## 2024-02-07 SDOH — SOCIAL STABILITY: SOCIAL INSECURITY: ARE YOU OR HAVE YOU BEEN THREATENED OR ABUSED PHYSICALLY, EMOTIONALLY, OR SEXUALLY BY ANYONE?: NO

## 2024-02-07 ASSESSMENT — COGNITIVE AND FUNCTIONAL STATUS - GENERAL
PATIENT BASELINE BEDBOUND: NO
DRESSING REGULAR UPPER BODY CLOTHING: A LITTLE
TOILETING: A LITTLE
TOILETING: A LITTLE
STANDING UP FROM CHAIR USING ARMS: A LITTLE
DAILY ACTIVITIY SCORE: 19
STANDING UP FROM CHAIR USING ARMS: A LITTLE
HELP NEEDED FOR BATHING: A LITTLE
MOBILITY SCORE: 18
MOBILITY SCORE: 18
DRESSING REGULAR UPPER BODY CLOTHING: A LITTLE
MOVING FROM LYING ON BACK TO SITTING ON SIDE OF FLAT BED WITH BEDRAILS: A LITTLE
DRESSING REGULAR LOWER BODY CLOTHING: A LITTLE
MOVING TO AND FROM BED TO CHAIR: A LITTLE
WALKING IN HOSPITAL ROOM: A LITTLE
WALKING IN HOSPITAL ROOM: A LITTLE
PERSONAL GROOMING: A LITTLE
HELP NEEDED FOR BATHING: A LITTLE
DAILY ACTIVITIY SCORE: 19
TURNING FROM BACK TO SIDE WHILE IN FLAT BAD: A LITTLE
CLIMB 3 TO 5 STEPS WITH RAILING: A LITTLE
PERSONAL GROOMING: A LITTLE
TURNING FROM BACK TO SIDE WHILE IN FLAT BAD: A LITTLE
MOVING FROM LYING ON BACK TO SITTING ON SIDE OF FLAT BED WITH BEDRAILS: A LITTLE
DRESSING REGULAR LOWER BODY CLOTHING: A LITTLE
CLIMB 3 TO 5 STEPS WITH RAILING: A LITTLE
MOVING TO AND FROM BED TO CHAIR: A LITTLE

## 2024-02-07 ASSESSMENT — ACTIVITIES OF DAILY LIVING (ADL)
WALKS IN HOME: NEEDS ASSISTANCE
ASSISTIVE_DEVICE: WALKER
FEEDING YOURSELF: NEEDS ASSISTANCE
JUDGMENT_ADEQUATE_SAFELY_COMPLETE_DAILY_ACTIVITIES: YES
GROOMING: NEEDS ASSISTANCE
ADEQUATE_TO_COMPLETE_ADL: NO
LACK_OF_TRANSPORTATION: NO
HEARING - RIGHT EAR: HEARING AID
BATHING: NEEDS ASSISTANCE
PATIENT'S MEMORY ADEQUATE TO SAFELY COMPLETE DAILY ACTIVITIES?: NO
TOILETING: NEEDS ASSISTANCE
DRESSING YOURSELF: NEEDS ASSISTANCE
HEARING - LEFT EAR: HEARING AID

## 2024-02-07 ASSESSMENT — LIFESTYLE VARIABLES
HOW OFTEN DO YOU HAVE A DRINK CONTAINING ALCOHOL: 2-4 TIMES A MONTH
HOW MANY STANDARD DRINKS CONTAINING ALCOHOL DO YOU HAVE ON A TYPICAL DAY: 1 OR 2
SKIP TO QUESTIONS 9-10: 1
HOW OFTEN DO YOU HAVE 6 OR MORE DRINKS ON ONE OCCASION: NEVER
AUDIT-C TOTAL SCORE: 2
AUDIT-C TOTAL SCORE: 2

## 2024-02-07 ASSESSMENT — PATIENT HEALTH QUESTIONNAIRE - PHQ9
2. FEELING DOWN, DEPRESSED OR HOPELESS: NOT AT ALL
1. LITTLE INTEREST OR PLEASURE IN DOING THINGS: NOT AT ALL
SUM OF ALL RESPONSES TO PHQ9 QUESTIONS 1 & 2: 0

## 2024-02-07 ASSESSMENT — PAIN - FUNCTIONAL ASSESSMENT: PAIN_FUNCTIONAL_ASSESSMENT: 0-10

## 2024-02-07 ASSESSMENT — PAIN SCALES - GENERAL
PAINLEVEL_OUTOF10: 0 - NO PAIN

## 2024-02-07 NOTE — PROGRESS NOTES
Call attempted to daughter for answers for MRI screening form since pt is forgetful. VM left. Son was also called but to no answer. Will update day team.

## 2024-02-07 NOTE — CONSULTS
"Inpatient consult to Neurology  Consult performed by: Joanne Cavanaugh DO  Consult ordered by: Dick Mccall DO          Reason For Consult  dizziness    History Of Present Illness  Heather Villalba is a 89 y.o. female presenting with dizzness x1 day. She states that she intermittent dizziness throughout the day 2/6 while ambulating with a walker. While she was ambulating in her room she felt dizzy and had to sit down. The symptoms are described as \"loss of balance\" and continued for 20-30 minutes. Once the symptoms subsided patient was able to maneuver by scooting on the floor to get help. They do recur with movement. She has never had these episodes before. She denies nausea, vomiting, syncope, abdominal pain, headache, or palpitations. She denies presyncopal symptoms when rising from sitting.     Patient states that she has been eating more due to losing weight. Patient's diet is fair upon interviewing as she is eating three meals/day. Of note, she lives in senior independent living.     Today patient denies any episodes of dizziness. She ambulated to restroom with assistance without any dizziness. ROS positive only for urinary frequency.    Past Medical History  HTN, HLD, chronic headache, glaucoma, OA, recurrent falls    Surgical History  She has a past surgical history that includes Cataract extraction (08/18/2014); Other surgical history (08/17/2016); Other surgical history (06/23/2016); Total knee arthroplasty (04/15/2016); and Ileostomy (04/15/2016).     Social History  She reports that she has never smoked. She has never used smokeless tobacco. She reports current alcohol use. She reports that she does not use drugs.    Family History  Family History   Problem Relation Name Age of Onset    Other (malignant neoplasm of the breast) Mother      Leukemia Father      Crohn's disease Brother          Allergies  Adhesive, Codeine, Doxycycline, Epinephrine, Gluten, Lactose, Latex, and Penicillins    Review of " Systems  -chronic vision loss in left eye  -chronic hearing loss in left ear  -urinary frequency  -dizziness  12 ROS negative aside from above.      Physical Exam  General: No acute distress  HEENT: PERRL, no nystagmus  MSK: strengthen equal bilaterally in lower and upper extremities  Neuro:  --HIF: A&O X 3, repetition and naming intact  --CN:  PERRLA, EOMI, visual fields intact, no visible facial asymmetry, facial sensation intact, no tongue or palatal deviation, SCM intact  --Motor: Moves all 4 extremities equally; no focal deficits  --Sensory: Intact to light touch  --Cerebellum: HTS intact  --Gait: Deferred   Psych: alert and oriented x3, mood and affect appropriate        Last Recorded Vitals  /75 (BP Location: Left arm, Patient Position: Lying)   Pulse 66   Temp 35.9 °C (96.6 °F) (Temporal)   Resp 16   Wt 49.4 kg (108 lb 14.5 oz)   SpO2 96%     Relevant Results  CT head showed no acute intracranial abnormalities with stable layering high density in the left globe. MRI brain pending.     Assessment/Plan     Heather Villalba is a 89 year old female with PmHx of HTN, HLD, chronic headaches, gluacoma, cataracts, osteoarthritis, osteoporosis, and recurrent falls who was admitted for dizziness x1 day. Patient was found to have UTI with leukocyte esterase and blood without nitrates. CT head showed no acute intracranial abnormalities with stable layering high density in the left globe. Brain MRI showing moderate microvascular disease, chronic retinal detachment. Patient remains hemodynamically stable without any neurological deficits on exam. She denies dizziness today at rest or ambulating.  Differentials include peripheral vertigo v central vertigo v orthostatic syncope    #Dizziness, likely BPPV  Likely peripheral vertigo, episodes occur while ambulating and lasted for 20-30 minutes. She describes the room as spinning which is more in line with vertigo. Patient has unilateral hearing loss in the left ear  that could contribute to vestibulocochlear nerve dysfunction. MRI brain ordered to rule out stroke and showed moderate microvascular disease, chronic retinal detachment. Orthostatic syncope unlikely as patient denies presyncopal symptoms while changing position and only while ambulating. Likely as her diet is fair with decreased fluid intake.  She denies continued symptoms    Plan:  -Start meclizine prn 25 mg TID for 3-5 days  -vestibular rehab    Miranda Daniel, MS3  Medical Student    Above plan reflects my input and was discussed with attending,    Joanne Cavanaugh DO, PGY3    Patient seen and examined.  Discussed with the medical student and the resident.  Continue with current treatment I believe most like this is benign positional vertigo could be due to acute labyrinthitis.  Continue with Antivert till the symptoms resolved.  Signs and the risk factor for CVA gait and safety issue with the precautions were discussed at great length.  Okay to discharge when medically stable and can follow-up with the patient retina specialist and the primary care physician as an outpatient treat underlying medical condition including UTI    Due to technical limitations of voice recognition and human error, this note may not accurately reflect the care of the patient.    Nicholas brito md/neurology.

## 2024-02-07 NOTE — CARE PLAN
Problem: Pain  Goal: My pain/discomfort is manageable  Outcome: Progressing     Problem: Safety  Goal: Patient will be injury free during hospitalization  Outcome: Progressing  Goal: I will remain free of falls  Outcome: Progressing     Problem: Daily Care  Goal: Daily care needs are met  Outcome: Progressing

## 2024-02-07 NOTE — PROGRESS NOTES
Occupational Therapy                 Therapy Communication Note    Patient Name: Heather Villalba  MRN: [id number]  Today's Date: 2/7/2024   Rm: [id number]    Discipline: Occupational Therapy    Comment: Received orders for OT eval 2/7. Completed chart review, and spoke with pt's RN this am. Pt's symptoms (dizziness) have resolved, and pt has been up independent in room. Pt with plans to be discharged home this date. Pt does not have any acute OT needs at this time, and OT services will be discharged.

## 2024-02-07 NOTE — HOSPITAL COURSE
"Heather Villalba is a 89 y.o. female with a past medical history of HTN, HLD, chronic headaches, glaucoma, cataracts, osteoarthritis, osteoporosis and recurrent falls A-fib not on anticoagulation who presents with a chief complaint of dizziness. Patient states that her symptoms began around 6 PM on the day of admission.  She describes feeling a \"loss of balance\" that occurs intermittently and usually lasts about 20 minutes before subsiding.  She also endorses a headache.  Blood denies vision changes, chest pain, shortness of breath, abdominal pain.  She reported to the emergency department immediately upon onset of symptoms.  Upon arrival to the ED she was initially hypertensive 176/77 but afebrile and oxygen saturation at 96% on room air.  Troponins were slightly elevated at 15. EKG showed normal sinus rhythm.  .  Echocardiogram was ordered and performed and is currently pending read at the time of discharge.  Sodium was slightly low at 133 BUN slightly elevated at 34 and creatinine normal 1.03.  Lipid panel was in normal other than elevated triglycerides. Urinalysis showed moderate leukocyte Estrace and some white blood cells which is suggestive of a urinary tract infection.  She was started on ceftriaxone for treatment of her UTI.  And NIH was performed and scored at 0 at the time of admission.      Neurology was consulted and evaluated the patient and suspected possibly vertigo and per their recommendation the patient was referred to a vestibular rehabilitation clinic.    The patient is being discharged in stable condition with aspirin to be taken daily and doxycycline to be taken 2 times per day for the next 4 days to complete a course of 5 days of antibiotics.  A referral has been placed to the vestibular rehabilitation clinic.  It is recommended to follow-up with primary care physician to discuss echocardiogram results and to discuss continued aspirin and/or statin therapy.  "

## 2024-02-07 NOTE — ED PROVIDER NOTES
HPI   Chief Complaint   Patient presents with    Dizziness    Fall     No thinners, no LOC, did Not hit head       This is a 89 years old female patient presented to the emergency department with a chief complaint of feeling unsteady and dizzy.  Denies any chest pain, shortness of breath, patient stated that she fell, denies loss of consciousness, no numbness, weakness, headache, lightheadedness, abdominal pain, diarrhea, constipation, weakness or focal numbness.  Patient denies hitting her head.    Review of system: As stated above in the HPI section.                          [place] Coma Scale Score: 15         NIH Stroke Scale: 0             Patient History   Past Medical History:   Diagnosis Date    Essential (primary) hypertension 08/26/2013    Hypertension    Meibomian gland dysfunction of unspecified eye, unspecified eyelid 09/20/2018    MGD (meibomian gland dysfunction)    Personal history of other diseases of the digestive system 07/23/2015    History of small bowel obstruction    Personal history of other diseases of the musculoskeletal system and connective tissue     Personal history of rheumatoid arthritis    Personal history of other diseases of the nervous system and sense organs     History of glaucoma    Personal history of other diseases of the nervous system and sense organs     History of sleep apnea    Personal history of other diseases of the nervous system and sense organs     History of cataract    Personal history of tuberculosis     History of tuberculosis    Ulcerative blepharitis left lower eyelid 09/20/2018    Ulcerative blepharitis of left lower eyelid    Ulcerative blepharitis left upper eyelid 09/20/2018    Ulcerative blepharitis of left upper eyelid    Ulcerative blepharitis right lower eyelid 09/20/2018    Ulcerative blepharitis of right lower eyelid    Ulcerative blepharitis right upper eyelid 09/20/2018    Ulcerative blepharitis of right upper eyelid    Unspecified glaucoma  10/28/2015    Glaucoma    Unspecified open-angle glaucoma, moderate stage 06/07/2021    Advanced open-angle glaucoma, moderate stage     Past Surgical History:   Procedure Laterality Date    CATARACT EXTRACTION  08/18/2014    Cataract Surgery    ILEOSTOMY  04/15/2016    Ileostomy    OTHER SURGICAL HISTORY  08/17/2016    Lysis Of Peritoneal Adhesions    OTHER SURGICAL HISTORY  06/23/2016    Laparoscopy Total Colectomy W/ Ileostomy Or Ileoproctostomy    TOTAL KNEE ARTHROPLASTY  04/15/2016    Knee Replacement     Family History   Problem Relation Name Age of Onset    Other (malignant neoplasm of the breast) Mother      Leukemia Father      Crohn's disease Brother       Social History     Tobacco Use    Smoking status: Never    Smokeless tobacco: Never   Vaping Use    Vaping Use: Never used   Substance Use Topics    Alcohol use: Yes     Comment: social    Drug use: Never       Physical Exam   ED Triage Vitals [02/06/24 2029]   Temperature Heart Rate Respirations BP   36.5 °C (97.7 °F) 72 18 176/77      Pulse Ox Temp Source Heart Rate Source Patient Position   96 % Temporal Monitor Sitting      BP Location FiO2 (%)     Left arm --       Physical Exam  Vitals and nursing note reviewed.   Constitutional:       General: She is not in acute distress.     Appearance: She is well-developed.   HENT:      Head: Normocephalic and atraumatic.   Eyes:      Conjunctiva/sclera: Conjunctivae normal.   Cardiovascular:      Rate and Rhythm: Normal rate and regular rhythm.      Heart sounds: No murmur heard.  Pulmonary:      Effort: Pulmonary effort is normal. No respiratory distress.      Breath sounds: Normal breath sounds.   Abdominal:      Palpations: Abdomen is soft.      Tenderness: There is no abdominal tenderness.   Musculoskeletal:         General: No swelling.      Cervical back: Neck supple.   Skin:     General: Skin is warm and dry.      Capillary Refill: Capillary refill takes less than 2 seconds.   Neurological:      Mental  Status: She is alert.      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  Beside the legal blindness on the left side her NIH stroke scale is 0.   Psychiatric:         Mood and Affect: Mood normal.         ED Course & MDM        Medical Decision Making  Patient seen and examined, given her unsteady gait, fall, age, will obtain CT head, and basic labs.  Labs are unremarkable as well as CT head.  Patient is admitted to the medicine team for concern of TIA/stroke.  Her NIH stroke scale is 0.  Patient has urinary tract infection.  Will administer Rocephin.          Procedure  Procedures     Dick Mccall DO  02/07/24 0581

## 2024-02-07 NOTE — CARE PLAN
The patient's goals for the shift include      The clinical goals for the shift include Pt will remain free from falls this shift      Problem: Pain  Goal: My pain/discomfort is manageable  Outcome: Progressing     Problem: Daily Care  Goal: Daily care needs are met  Outcome: Progressing     Problem: Safety  Goal: Patient will be injury free during hospitalization  Outcome: Progressing  Goal: I will remain free of falls  Outcome: Progressing     Problem: Psychosocial Needs  Goal: Demonstrates ability to cope with hospitalization/illness  Outcome: Progressing  Goal: Collaborate with me, my family, and caregiver to identify my specific goals  Outcome: Progressing     Problem: Discharge Barriers  Goal: My discharge needs are met  Outcome: Progressing

## 2024-02-07 NOTE — DISCHARGE SUMMARY
Very questionable discharge Diagnosis  Stroke, recent, without late effect    Issues Requiring Follow-Up  Dr. Jone Douglas = discharged on daily aspirin therapy, will defer decision to start statin therapy to primary care physician; echocardiogram is pending read but has been performed on 2/7; patient was treated with doxycycline for her UTI, no changes to home medications, referral has been made to vestibular rehab    Medications this hospital administration = daily aspirin 81 mg, doxycycline twice daily for 4 days    Discharge Meds     Your medication list        START taking these medications        Instructions Last Dose Given Next Dose Due   aspirin 81 mg EC tablet  Start taking on: February 8, 2024      Take 1 tablet (81 mg) by mouth once daily. Do not start before February 8, 2024.       doxycycline 100 mg capsule  Commonly known as: Vibramycin      Take 1 capsule (100 mg) by mouth every 12 hours for 4 days. Take with at least 8 ounces (large glass) of water, do not lie down for 30 minutes after              CHANGE how you take these medications        Instructions Last Dose Given Next Dose Due   loperamide 2 mg capsule  Commonly known as: Imodium  What changed: Another medication with the same name was removed. Continue taking this medication, and follow the directions you see here.      TAKE 3 CAPSULES BY MOUTH IN THE MORNING , THEN 2  CAPSULES AT LUNCH AND AT  DINNER              CONTINUE taking these medications        Instructions Last Dose Given Next Dose Due   COENZYME Q10 (BULK) MISC           cyanocobalamin 1,000 mcg tablet  Commonly known as: Vitamin B-12           escitalopram 10 mg tablet  Commonly known as: Lexapro      Take 1 tablet (10 mg) by mouth once daily.       fluticasone 50 mcg/actuation nasal spray  Commonly known as: Flonase           latanoprost 0.005 % ophthalmic solution  Commonly known as: Xalatan           melatonin 5 mg capsule           multivitamin tablet          "  nortriptyline 10 mg capsule  Commonly known as: Pamelor      Take 1 capsule (10 mg) by mouth once daily at bedtime.       One-Per-Day Omega-3 684-1,200 mg capsule  Generic drug: omega-3 fatty acids-fish oil           Restasis 0.05 % ophthalmic emulsion  Generic drug: cycloSPORINE      INSTILL 1 DROP INTO BOTH EYES  EVERY 12 HOURS                 Where to Get Your Medications        Information about where to get these medications is not yet available    Ask your nurse or doctor about these medications  aspirin 81 mg EC tablet  doxycycline 100 mg capsule         Test Results Pending At Discharge  Pending Labs       Order Current Status    Urine Culture In process            Hospital Course  Heather Villalba is a 89 y.o. female with a past medical history of HTN, HLD, chronic headaches, glaucoma, cataracts, osteoarthritis, osteoporosis and recurrent falls A-fib not on anticoagulation who presents with a chief complaint of dizziness. Patient states that her symptoms began around 6 PM on the day of admission.  She describes feeling a \"loss of balance\" that occurs intermittently and usually lasts about 20 minutes before subsiding.  She also endorses a headache.  Blood denies vision changes, chest pain, shortness of breath, abdominal pain.  She reported to the emergency department immediately upon onset of symptoms.  Upon arrival to the ED she was initially hypertensive 176/77 but afebrile and oxygen saturation at 96% on room air.  Troponins were slightly elevated at 15. EKG showed normal sinus rhythm.  .  Echocardiogram was ordered and performed and is currently pending read at the time of discharge.  Sodium was slightly low at 133 BUN slightly elevated at 34 and creatinine normal 1.03.  Lipid panel was in normal other than elevated triglycerides. Urinalysis showed moderate leukocyte Estrace and some white blood cells which is suggestive of a urinary tract infection.  She was started on ceftriaxone for treatment " of her UTI.  And NIH was performed and scored at 0 at the time of admission.      Neurology was consulted and evaluated the patient and suspected possibly vertigo and per their recommendation the patient was referred to a vestibular rehabilitation clinic.    The patient is being discharged in stable condition with aspirin to be taken daily and doxycycline to be taken 2 times per day for the next 4 days to complete a course of 5 days of antibiotics.  A referral has been placed to the vestibular rehabilitation clinic.  It is recommended to follow-up with primary care physician to discuss echocardiogram results and to discuss continued aspirin and/or statin therapy.    Pertinent Physical Exam At Time of Discharge  Physical Exam  Constitutional:       Appearance: She is normal weight.   HENT:      Mouth/Throat:      Mouth: Mucous membranes are moist.      Pharynx: Oropharynx is clear.   Eyes:      Conjunctiva/sclera: Conjunctivae normal.   Cardiovascular:      Rate and Rhythm: Normal rate and regular rhythm.      Heart sounds: Normal heart sounds.   Pulmonary:      Effort: Pulmonary effort is normal. No respiratory distress.      Breath sounds: Normal breath sounds.   Abdominal:      General: Abdomen is flat. Bowel sounds are normal. There is no distension.      Palpations: Abdomen is soft.      Tenderness: There is no abdominal tenderness.   Musculoskeletal:      Right lower leg: No edema.      Left lower leg: No edema.   Skin:     General: Skin is warm and dry.   Neurological:      Mental Status: She is alert.      Comments: Oriented to self and place; per chart review this appears baseline   Psychiatric:         Mood and Affect: Mood normal.         Outpatient Follow-Up  Future Appointments   Date Time Provider Department Center   3/1/2024 11:00 AM Rios Gonsales DO [id number] [facility]   3/7/2024 11:00 AM Jone Yo DO [id number] [facility]   7/10/2024 10:45 AM Hebert Lou MD [id number] [facility]         Saumya DELEON  Sloan, DO

## 2024-02-07 NOTE — H&P
"  History Of Present Illness  Heather Villalba is a 89 y.o. female with a past medical history of HTN, HLD, chronic headaches, glaucoma,  cataracts, osteoarthritis, osteoporosis and recurrent falls who presents with a chief complaint of dizziness. Patient states that her symptoms began around 6 PM today. Describes it as a feeling of \"loss of balance\" that occurs intermittently and usually lasts for about 20 minutes before subsiding. Denies having any episodes like this in the past. States that she did not try anything for her symptoms at home and immediately reported to the ED via EMS for evaluation. She endorses currently having a headache but denies any other symptoms at this time. No new-onset vision changes, chest pain, shortness of breath, abdominal pain, NVD.      PMHx: As above  PSurgHx:  Cataract extraction (08/18/2014); Other surgical history (08/17/2016); Other surgical history (06/23/2016); Total knee arthroplasty (04/15/2016); and Ileostomy (04/15/2016).  Meds: atenolol, escitalopram, latanoprost, fluticasone, loperamide, imodium, melatonin, nortriptyline, omeprazole  Allergies: Adhesive, Codeine, Doxycycline, Epinephrine, Gluten, Lactose, Latex, and Penicillins  SHx: She reports that she has never smoked. She has never used smokeless tobacco. She reports current alcohol use. She reports that she does not use drugs.        ED Course:  Vitals: /77, HR 72, Respiration 18, Temp 36.5, oxygen saturation 96% on RA  Labs: Slightly elevated troponin at 15, sodium slightly low at 133, BUN elevated at 34, Cr wnl at 1.03, UA showing moderate leukocytes esterase and WBCs  Imaging: CT head without IV contrast showed no acute intracranial abnormalities   Interventions: Patient was given IV rocephin      Review of Systems  ROS: 12 systems reviewed and negative except per HPI above     Physical Exam by System:    Constitutional: Well developed, awake/alert/oriented x3, no distress, alert and cooperative   ENMT: " "mucous membranes moist   Head/Neck: Neck supple   Respiratory/Thorax: Patent airways, CTAB, normal breath sounds with good chest expansion, thorax symmetric   Cardiovascular: Regular, rate and rhythm, no murmurs, 2+ equal pulses of the extremities, normal S 1and S 2   Gastrointestinal: Nondistended, soft, non-tender, no rebound tenderness or guarding, no masses palpable, no organomegaly, +BS, no bruits   Musculoskeletal: ROM intact, no joint swelling, normal strength   Extremities: normal extremities, no cyanosis edema, contusions or wounds, no clubbing   Neurological: alert and oriented x3, intact cranial nerves, senses, motor, response and reflexes, normal strength       Last Recorded Vitals  Blood pressure 121/76, pulse 62, temperature 36.5 °C (97.7 °F), temperature source Temporal, resp. rate 18, height 1.575 m (5' 2\"), weight 51.7 kg (114 lb), SpO2 96 %.    Relevant Results  Results for orders placed or performed during the hospital encounter of 02/06/24 (from the past 24 hour(s))   Comprehensive metabolic panel   Result Value Ref Range    Glucose 123 (H) 74 - 99 mg/dL    Sodium 133 (L) 136 - 145 mmol/L    Potassium 4.2 3.5 - 5.3 mmol/L    Chloride 102 98 - 107 mmol/L    Bicarbonate 25 21 - 32 mmol/L    Anion Gap 10 10 - 20 mmol/L    Urea Nitrogen 34 (H) 6 - 23 mg/dL    Creatinine 1.02 0.50 - 1.05 mg/dL    eGFR 51 (L) >60 mL/min/1.73m*2    Calcium 10.5 (H) 8.6 - 10.3 mg/dL    Albumin 4.2 3.4 - 5.0 g/dL    Alkaline Phosphatase 49 33 - 136 U/L    Total Protein 6.6 6.4 - 8.2 g/dL    AST 20 9 - 39 U/L    Bilirubin, Total 0.4 0.0 - 1.2 mg/dL    ALT 11 7 - 45 U/L   CBC   Result Value Ref Range    WBC 5.9 4.4 - 11.3 x10*3/uL    nRBC 0.0 0.0 - 0.0 /100 WBCs    RBC 3.72 (L) 4.00 - 5.20 x10*6/uL    Hemoglobin 11.4 (L) 12.0 - 16.0 g/dL    Hematocrit 35.8 (L) 36.0 - 46.0 %    MCV 96 80 - 100 fL    MCH 30.6 26.0 - 34.0 pg    MCHC 31.8 (L) 32.0 - 36.0 g/dL    RDW 13.0 11.5 - 14.5 %    Platelets 186 150 - 450 x10*3/uL "   Troponin I, High Sensitivity   Result Value Ref Range    Troponin I, High Sensitivity 15 (H) 0 - 13 ng/L   Urinalysis with Reflex Culture and Microscopic   Result Value Ref Range    Color, Urine Yellow Straw, Yellow    Appearance, Urine Hazy (N) Clear    Specific Gravity, Urine 1.009 1.005 - 1.035    pH, Urine 5.0 5.0, 5.5, 6.0, 6.5, 7.0, 7.5, 8.0    Protein, Urine NEGATIVE NEGATIVE mg/dL    Glucose, Urine NEGATIVE NEGATIVE mg/dL    Blood, Urine LARGE (3+) (A) NEGATIVE    Ketones, Urine NEGATIVE NEGATIVE mg/dL    Bilirubin, Urine NEGATIVE NEGATIVE    Urobilinogen, Urine <2.0 <2.0 mg/dL    Nitrite, Urine NEGATIVE NEGATIVE    Leukocyte Esterase, Urine MODERATE (2+) (A) NEGATIVE   Microscopic Only, Urine   Result Value Ref Range    WBC, Urine 21-50 (A) 1-5, NONE /HPF    RBC, Urine >20 (A) NONE, 1-2, 3-5 /HPF      Scheduled medications  [START ON 2/8/2024] aspirin, 81 mg, oral, Daily  atorvastatin, 80 mg, oral, Daily  cefTRIAXone, 1 g, intravenous, Once  enoxaparin, 30 mg, subcutaneous, q24h      Continuous medications     PRN medications  PRN medications: hydrALAZINE **FOLLOWED BY** [START ON 2/9/2024] hydrALAZINE, labetaloL, oxygen, polyethylene glycol     CT head wo IV contrast    Result Date: 2/6/2024  Interpreted By:  Momo Kong, STUDY: CT HEAD WO IV CONTRAST;  2/6/2024 11:19 pm   INDICATION: Signs/Symptoms:DIZZINESS.   COMPARISON: CT head 09/22/2020   ACCESSION NUMBER(S): [id number]   ORDERING CLINICIAN: BRISEYDA URBINA   TECHNIQUE: Noncontrast axial CT images of head were obtained with coronal and sagittal reconstructed images.   FINDINGS: BRAIN PARENCHYMA: Gray-white differentiation is preserved. No mass-effect, midline shift or effacement of cerebral sulci. Patchy periventricular and subcortical white matter hypodensities, nonspecific but often seen in the setting of chronic microangiopathic change.   HEMORRHAGE: No acute intracranial hemorrhage.   VENTRICLES and EXTRA-AXIAL SPACES: The ventricles and  sulci are within normal limits for brain volume. No abnormal extra-axial fluid collection.   ORBITS: Layering dependent density within the left globe is not substantially changed from September 2020.   EXTRACRANIAL SOFT TISSUES: Within normal limits.   PARANASAL SINUSES/MASTOIDS: The visualized paranasal sinuses and mastoid air cells are well aerated.   CALVARIUM: No depressed skull fracture.         1. No acute intracranial abnormality identified. 2. Layering high density within the left globe, not substantially changed since November 2020.     Please note that non-contrast CT may be relatively insensitive in the detection of acute ischemia, particularly in the posterior fossa. If there is clinical suspicion for such, then MRI may be performed for further assessment.   MACRO: None   Signed by: Momo Kong 2/6/2024 11:33 PM Dictation workstation:   [id number]            Assessment/Plan   Principal Problem:    Stroke, recent, without late effect    Assessment   Patient is a 89 y.o. female with a past medical history of HTN, HLD, chronic headaches, glaucoma,  cataracts, osteoarthritis, osteoporosis and recurrent falls who presents with a chief complaint of dizziness. Upon arrival to the ED, patient was hypertensive at at 176/77 otherwise remaining vitals were stable. Initial NIHSS was 0.  On evaluation, patient was not noted to have any cranial nerve deficits. Motor and sensation were in tact. Patient has no significant findings on blood work. UA showed moderate amount of leukocyte esterase and WBCs. CT scan without IV contrast showed no acute intracranial abnormalities. Patient was given rocephin due to concerns for UTI. Given patient's continued symptoms with negative workup, she was transferred to the medicine floor for a more extensive stroke workup.     Plan    # Acute dizziness, concern for ischemic stroke vs TIA vs cardiac etiology/arrhythmias  #History of A-fib, not on AC  --NIHSS 0  -CT head without IV  contrast 2/6: no acute intracranial pathology noted  -MRI of brain wo IV contrast pending  -Stroke workup: Echo and MRI of brain pending, Lipid panel showed elevated triglycerides at 194, BNP elevated at 116, HgbA1c pending  -Neurology consulted, appreciate recommendations   -Given aspirin 325 on medicine floor 2/7, continue ASA 81 mg daily  -Continue statin daily  -Will allow for permissive HTN in setting of acute ischemic infarct  -Neurochecks q4h  -Monitor on telemetry  -PT/OT/SLP eval  -NPO until patient passes bedside swallow evalation and MRI is completed     #UTI  -UA showed moderate levels of leukocyte esterase, WBCs and RBCs  -Patient started on IV rocephin while in the ED   -Follow up urine culture and adjust abx as needed    Chronic conditions:  #HTN  #HLD,  #chronic headaches  #glaucoma  #cataracts,   #osteoarthritis  #osteoporosis  #recurrent falls   -will continue home medications and management as indicated once med rec is done.  -fall precautions      CODE STATUS:   DVT prophylaxis: Lovenox   Diet: NPO  Telemetry: continuous   Consults: Neurology, PT/OT  Dispo: Patient is being admitted     To be staffed with attending physician    Bin Riojas MD  PGY-1 EMERGENCY MEDICINE       SIGNATURE: Bin Riojas MD PATIENT NAME: Heather Villalba   DATE: February 7, 2024 MRN: [id number]   TIME: 3:20 AM       ---------------------------------------------------------------------------------------    Senior addendum: The patient was seen and examined independently from the intern physician.  The above documentation reflects my edits and input.    Discussed with attending,  Subhash Yoder M.D. PGY-2  Internal Medicine    This note has been transcribed using Dragon voice recognition system and there is a possibility of unintentional typing misprints.  Any information found to be copied from previous providers is done in the best interest of the patient to provide accurate, quality, and continuity of care.

## 2024-02-07 NOTE — PROGRESS NOTES
Physical Therapy                 Therapy Communication Note    Patient Name: Heather Villalba  MRN: [id number]  Today's Date: 2/7/2024   [id number]    Discipline: Physical Therapy    Missed Time: Attempt    Comment:  PT orders received, chart reviewed.  Per RN, pt is indep and will be discharging home with no PT needs.  Will DC PT orders at this time, please re-consult if status changes.

## 2024-02-07 NOTE — DISCHARGE INSTRUCTIONS
"Please call your primary care physician, Dr. Jone Douglas, within 1-2 days of discharge. You have an echocardiogram outstanding and you will need to discuss if it appropriate to continue an aspirin and/or statin. You are being prescribed aspirin to take daily please discuss statin therapy with your doctor.    You were prescribed aspirin to help thin the blood and prevent a stroke.      A neurologist evaluated you and suggested vestibular rehabilitation. A referral has been sent.     We checked your urine and you had a urinary tract infection. You will take doxycycline two times per day for 4 more days for a total of 5 days.    No changes have been made to your home medications. Any medication listed as \"change how you take\" or \"stop taking\" was reported by you and confirmed by our pharmacy team.     Thank you for allowing us to take part in your care.  [facility] Teaching Team        "

## 2024-02-08 PROBLEM — Z86.73 STROKE, RECENT, WITHOUT LATE EFFECT: Status: RESOLVED | Noted: 2024-02-07 | Resolved: 2024-02-08

## 2024-02-08 LAB
AORTIC VALVE PEAK VELOCITY: 1.46 M/S
AV PEAK GRADIENT: 8.5 MMHG
AVA (PEAK VEL): 1.67 CM2
BACTERIA UR CULT: NORMAL
EJECTION FRACTION APICAL 4 CHAMBER: 42.5
EJECTION FRACTION: 46 %
LEFT ATRIUM VOLUME AREA LENGTH INDEX BSA: 32.6 ML/M2
LEFT VENTRICLE INTERNAL DIMENSION DIASTOLE: 4.9 CM (ref 3.5–6)
LEFT VENTRICULAR OUTFLOW TRACT DIAMETER: 1.8 CM
MITRAL VALVE E/A RATIO: 0.49
RIGHT VENTRICLE FREE WALL PEAK S': 12.2 CM/S
RIGHT VENTRICLE PEAK SYSTOLIC PRESSURE: 30.2 MMHG
TRICUSPID ANNULAR PLANE SYSTOLIC EXCURSION: 1.8 CM

## 2024-02-08 RX ORDER — DOXYCYCLINE 100 MG/1
100 CAPSULE ORAL EVERY 12 HOURS SCHEDULED
Qty: 10 CAPSULE | Refills: 0 | Status: SHIPPED | OUTPATIENT
Start: 2024-02-08 | End: 2024-02-13

## 2024-02-12 DIAGNOSIS — R31.9 HEMATURIA, UNSPECIFIED TYPE: Primary | ICD-10-CM

## 2024-02-19 ENCOUNTER — APPOINTMENT (OUTPATIENT)
Dept: OPHTHALMOLOGY | Facility: CLINIC | Age: 89
End: 2024-02-19
Payer: MEDICARE

## 2024-02-28 DIAGNOSIS — H16.223 KERATOCONJUNCTIVITIS SICCA OF BOTH EYES NOT DUE TO SJOGREN'S SYNDROME: ICD-10-CM

## 2024-02-28 RX ORDER — CYCLOSPORINE 0.5 MG/ML
1 EMULSION OPHTHALMIC EVERY 12 HOURS
Qty: 60 EACH | Refills: 2 | Status: SHIPPED | OUTPATIENT
Start: 2024-02-28 | End: 2024-04-08 | Stop reason: SDUPTHER

## 2024-03-01 ENCOUNTER — OFFICE VISIT (OUTPATIENT)
Dept: NEUROLOGY | Facility: CLINIC | Age: 89
End: 2024-03-01
Payer: MEDICARE

## 2024-03-01 VITALS
SYSTOLIC BLOOD PRESSURE: 149 MMHG | RESPIRATION RATE: 16 BRPM | BODY MASS INDEX: 19.49 KG/M2 | DIASTOLIC BLOOD PRESSURE: 78 MMHG | WEIGHT: 110 LBS | HEART RATE: 74 BPM

## 2024-03-01 DIAGNOSIS — G44.86 CERVICOGENIC HEADACHE: Primary | ICD-10-CM

## 2024-03-01 PROCEDURE — 1157F ADVNC CARE PLAN IN RCRD: CPT | Performed by: STUDENT IN AN ORGANIZED HEALTH CARE EDUCATION/TRAINING PROGRAM

## 2024-03-01 PROCEDURE — 99214 OFFICE O/P EST MOD 30 MIN: CPT | Performed by: STUDENT IN AN ORGANIZED HEALTH CARE EDUCATION/TRAINING PROGRAM

## 2024-03-01 PROCEDURE — 3077F SYST BP >= 140 MM HG: CPT | Performed by: STUDENT IN AN ORGANIZED HEALTH CARE EDUCATION/TRAINING PROGRAM

## 2024-03-01 PROCEDURE — 1125F AMNT PAIN NOTED PAIN PRSNT: CPT | Performed by: STUDENT IN AN ORGANIZED HEALTH CARE EDUCATION/TRAINING PROGRAM

## 2024-03-01 PROCEDURE — 1159F MED LIST DOCD IN RCRD: CPT | Performed by: STUDENT IN AN ORGANIZED HEALTH CARE EDUCATION/TRAINING PROGRAM

## 2024-03-01 PROCEDURE — 1036F TOBACCO NON-USER: CPT | Performed by: STUDENT IN AN ORGANIZED HEALTH CARE EDUCATION/TRAINING PROGRAM

## 2024-03-01 PROCEDURE — 3078F DIAST BP <80 MM HG: CPT | Performed by: STUDENT IN AN ORGANIZED HEALTH CARE EDUCATION/TRAINING PROGRAM

## 2024-03-01 RX ORDER — GUAIFENESIN 600 MG/1
TABLET, EXTENDED RELEASE ORAL EVERY 12 HOURS
COMMUNITY
Start: 2019-01-24

## 2024-03-01 ASSESSMENT — PATIENT HEALTH QUESTIONNAIRE - PHQ9
SUM OF ALL RESPONSES TO PHQ9 QUESTIONS 1 AND 2: 1
10. IF YOU CHECKED OFF ANY PROBLEMS, HOW DIFFICULT HAVE THESE PROBLEMS MADE IT FOR YOU TO DO YOUR WORK, TAKE CARE OF THINGS AT HOME, OR GET ALONG WITH OTHER PEOPLE: NOT DIFFICULT AT ALL
1. LITTLE INTEREST OR PLEASURE IN DOING THINGS: NOT AT ALL
2. FEELING DOWN, DEPRESSED OR HOPELESS: SEVERAL DAYS

## 2024-03-01 ASSESSMENT — ENCOUNTER SYMPTOMS
DEPRESSION: 0
LOSS OF SENSATION IN FEET: 0
OCCASIONAL FEELINGS OF UNSTEADINESS: 0

## 2024-03-01 ASSESSMENT — PAIN SCALES - GENERAL: PAINLEVEL: 4

## 2024-03-01 NOTE — PROGRESS NOTES
Subjective   Heather Villalba is a 89 y.o.   female who is being followed for cervicogenic headache affecting the L KERI.    Since last seen, patient states that headaches are much improved after starting nortriptyline and doing PT. Headaches no longer seem to affect quality of life fortunately. Often forgets to do stretches at home or has poor energy limiting.    Of note patient having some increased hearing deficit and daughter states that she has had more confusion. Lives in independent living. Has wandered off at a recent family dinner. Family is considering getting a caretaker. Patient notes that grief may be contributing after the death of her boyfriend in November.      Current Outpatient Medications   Medication Instructions    aspirin 81 mg, oral, Daily    cyanocobalamin (Vitamin B-12) 1,000 mcg tablet 1 tablet, oral, Daily    escitalopram (LEXAPRO) 10 mg, oral, Daily    fluticasone (Flonase) 50 mcg/actuation nasal spray 1 spray, nasal, 2 times daily    guaiFENesin (Mucinex) 600 mg 12 hr tablet oral, Every 12 hours    L. acidophilus/Bifid. animalis 32 billion cell capsule oral    latanoprost (Xalatan) 0.005 % ophthalmic solution 1 drop, Both Eyes, Nightly    loperamide (Imodium) 2 mg capsule TAKE 3 CAPSULES BY MOUTH IN THE MORNING , THEN 2  CAPSULES AT LUNCH AND AT  DINNER    melatonin 5 mg capsule 1 capsule, oral, Nightly    multivitamin tablet 1 tablet, oral, Daily    nortriptyline (PAMELOR) 10 mg, oral, Nightly    OMEGA-3 ACID ETHYL ESTERS ORAL oral    omega-3 fatty acids-fish oil (One-Per-Day Omega-3) 684-1,200 mg capsule 1 capsule, oral, Daily    Restasis 0.05 % ophthalmic emulsion 1 drop, Both Eyes, Every 12 hours    ubidecarenone (COENZYME Q10, BULK, MISC) 1 capsule, oral, Daily       Assessment/Plan   Patient with L cervicogenic headache. Nortriptyline and PT have effectively controlled headaches. Could consider increasing dose, however would not want to exacerbate cognitive concerns. Suspect a  component of pseudodementia contributing to cognitive status. Reinforced import of daily stretching.    - continue PT  - continue nortriptyline 10mg at bedtime  - follow up in 6 months    I personally spent 35 minutes today, exclusive of procedures, providing care for this patient, including preparation, face to face time, documentation and other services such as review of medical records, diagnostic result, patient education, counseling, coordination of care as specified in the encounter.

## 2024-03-07 ENCOUNTER — LAB (OUTPATIENT)
Dept: LAB | Facility: LAB | Age: 89
End: 2024-03-07
Payer: MEDICARE

## 2024-03-07 ENCOUNTER — OFFICE VISIT (OUTPATIENT)
Dept: PRIMARY CARE | Facility: CLINIC | Age: 89
End: 2024-03-07
Payer: MEDICARE

## 2024-03-07 ENCOUNTER — HOSPITAL ENCOUNTER (OUTPATIENT)
Dept: RADIOLOGY | Facility: CLINIC | Age: 89
Discharge: HOME | End: 2024-03-07
Payer: MEDICARE

## 2024-03-07 VITALS
WEIGHT: 110 LBS | BODY MASS INDEX: 19.49 KG/M2 | DIASTOLIC BLOOD PRESSURE: 70 MMHG | OXYGEN SATURATION: 97 % | TEMPERATURE: 97.7 F | RESPIRATION RATE: 16 BRPM | SYSTOLIC BLOOD PRESSURE: 120 MMHG | HEART RATE: 91 BPM

## 2024-03-07 DIAGNOSIS — R05.9 COUGH, UNSPECIFIED TYPE: ICD-10-CM

## 2024-03-07 DIAGNOSIS — R23.2 HOT FLASHES: Primary | ICD-10-CM

## 2024-03-07 DIAGNOSIS — R53.83 OTHER FATIGUE: ICD-10-CM

## 2024-03-07 DIAGNOSIS — R23.2 HOT FLASHES: ICD-10-CM

## 2024-03-07 LAB
BASOPHILS # BLD AUTO: 0.03 X10*3/UL (ref 0–0.1)
BASOPHILS NFR BLD AUTO: 0.5 %
EOSINOPHIL # BLD AUTO: 0.19 X10*3/UL (ref 0–0.4)
EOSINOPHIL NFR BLD AUTO: 3.2 %
ERYTHROCYTE [DISTWIDTH] IN BLOOD BY AUTOMATED COUNT: 13.2 % (ref 11.5–14.5)
HCT VFR BLD AUTO: 38.6 % (ref 36–46)
HGB BLD-MCNC: 11.9 G/DL (ref 12–16)
IMM GRANULOCYTES # BLD AUTO: 0.02 X10*3/UL (ref 0–0.5)
IMM GRANULOCYTES NFR BLD AUTO: 0.3 % (ref 0–0.9)
LYMPHOCYTES # BLD AUTO: 1.32 X10*3/UL (ref 0.8–3)
LYMPHOCYTES NFR BLD AUTO: 22.1 %
MCH RBC QN AUTO: 30.7 PG (ref 26–34)
MCHC RBC AUTO-ENTMCNC: 30.8 G/DL (ref 32–36)
MCV RBC AUTO: 100 FL (ref 80–100)
MONOCYTES # BLD AUTO: 0.54 X10*3/UL (ref 0.05–0.8)
MONOCYTES NFR BLD AUTO: 9.1 %
NEUTROPHILS # BLD AUTO: 3.86 X10*3/UL (ref 1.6–5.5)
NEUTROPHILS NFR BLD AUTO: 64.8 %
NRBC BLD-RTO: 0 /100 WBCS (ref 0–0)
PLATELET # BLD AUTO: 206 X10*3/UL (ref 150–450)
RBC # BLD AUTO: 3.88 X10*6/UL (ref 4–5.2)
TSH SERPL-ACNC: 1.84 MIU/L (ref 0.44–3.98)
WBC # BLD AUTO: 6 X10*3/UL (ref 4.4–11.3)

## 2024-03-07 PROCEDURE — G0439 PPPS, SUBSEQ VISIT: HCPCS | Performed by: INTERNAL MEDICINE

## 2024-03-07 PROCEDURE — 71046 X-RAY EXAM CHEST 2 VIEWS: CPT | Performed by: RADIOLOGY

## 2024-03-07 PROCEDURE — 71046 X-RAY EXAM CHEST 2 VIEWS: CPT

## 2024-03-07 PROCEDURE — 1157F ADVNC CARE PLAN IN RCRD: CPT | Performed by: INTERNAL MEDICINE

## 2024-03-07 PROCEDURE — 1170F FXNL STATUS ASSESSED: CPT | Performed by: INTERNAL MEDICINE

## 2024-03-07 PROCEDURE — 99214 OFFICE O/P EST MOD 30 MIN: CPT | Performed by: INTERNAL MEDICINE

## 2024-03-07 PROCEDURE — 85025 COMPLETE CBC W/AUTO DIFF WBC: CPT

## 2024-03-07 PROCEDURE — 3074F SYST BP LT 130 MM HG: CPT | Performed by: INTERNAL MEDICINE

## 2024-03-07 PROCEDURE — 1159F MED LIST DOCD IN RCRD: CPT | Performed by: INTERNAL MEDICINE

## 2024-03-07 PROCEDURE — 36415 COLL VENOUS BLD VENIPUNCTURE: CPT

## 2024-03-07 PROCEDURE — 84443 ASSAY THYROID STIM HORMONE: CPT

## 2024-03-07 PROCEDURE — 1125F AMNT PAIN NOTED PAIN PRSNT: CPT | Performed by: INTERNAL MEDICINE

## 2024-03-07 PROCEDURE — 3078F DIAST BP <80 MM HG: CPT | Performed by: INTERNAL MEDICINE

## 2024-03-07 PROCEDURE — 1036F TOBACCO NON-USER: CPT | Performed by: INTERNAL MEDICINE

## 2024-03-07 ASSESSMENT — PATIENT HEALTH QUESTIONNAIRE - PHQ9
2. FEELING DOWN, DEPRESSED OR HOPELESS: SEVERAL DAYS
SUM OF ALL RESPONSES TO PHQ9 QUESTIONS 1 AND 2: 2
1. LITTLE INTEREST OR PLEASURE IN DOING THINGS: SEVERAL DAYS
10. IF YOU CHECKED OFF ANY PROBLEMS, HOW DIFFICULT HAVE THESE PROBLEMS MADE IT FOR YOU TO DO YOUR WORK, TAKE CARE OF THINGS AT HOME, OR GET ALONG WITH OTHER PEOPLE: SOMEWHAT DIFFICULT

## 2024-03-07 ASSESSMENT — ENCOUNTER SYMPTOMS
HEADACHES: 1
DIAPHORESIS: 1
COUGH: 1
FATIGUE: 1

## 2024-03-07 ASSESSMENT — ACTIVITIES OF DAILY LIVING (ADL)
MANAGING_FINANCES: NEEDS ASSISTANCE
DOING_HOUSEWORK: INDEPENDENT
DRESSING: INDEPENDENT
GROCERY_SHOPPING: NEEDS ASSISTANCE
BATHING: INDEPENDENT
TAKING_MEDICATION: INDEPENDENT

## 2024-03-07 NOTE — PROGRESS NOTES
"Patient here for a medicare wellness visit and follow up     Subjective   Patient ID: Heather Villalba is a 89 y.o. female who presents for Medicare Annual Wellness Visit Subsequent and Follow-up.  She is accompanied by her daughter, who offers some of the history.    The patient states that she has been taking the nortriptyline 10mg once nightly consistently for headache, and she feels as to though this is helping.  She states that physical therapy twice a week has been beneficial as well.  The patient is also sleeping better as a result of the medication.  She met with  from Neurology who suggested cervicogenic etiology per the note.      The patient notes that she has been decreasing her socialization due to grieving over a friend who passed away in 11/2023. This is associated with significant fatigue, and she finds herself napping throughout the day.  The patient has been riding a stationary bicycle twice daily, and she feels that this is helping.  She continues to take escitalopram 10mg once daily.    The patient reports nocturnal hot flashes and night sweats since 1/2024 despite minimal changes to her bedtime routine.  This is associated with two firm, tender, masses in the chondral area.  In addition, the patient notes a cough since 2/2024 along with a sensation of \"a crumb stuck in her throat\" that has been difficult to clear.  Her daughter states that she has had Tuberculosis in the past.     The patient mentions mild ear fullness on the left side, and inquires whether she can use diluted hydrogen peroxide for irrigation at home.    The patient wears prescription lenses, and denies any vision changes.     The patient's relative states that she has been having difficulty remembering Doctor's appointments more recently.        Review of Systems   Constitutional:  Positive for diaphoresis and fatigue.   HENT:          Positive for left ear fullness.   Eyes:  Negative for visual disturbance. "   Respiratory:  Positive for cough.    Endocrine: Positive for heat intolerance.   Neurological:  Positive for headaches.       Objective   Physical Exam  Constitutional:       Appearance: Normal appearance.   HENT:      Right Ear: Tympanic membrane normal. There is no impacted cerumen.      Left Ear: Tympanic membrane normal. There is no impacted cerumen.   Neck:      Vascular: No carotid bruit.   Cardiovascular:      Rate and Rhythm: Normal rate and regular rhythm.      Heart sounds: Normal heart sounds.   Pulmonary:      Effort: Pulmonary effort is normal.      Breath sounds: Normal breath sounds.   Abdominal:      General: Bowel sounds are normal.      Palpations: Abdomen is soft.      Tenderness: There is no abdominal tenderness.   Skin:     General: Skin is warm and dry.   Neurological:      General: No focal deficit present.      Mental Status: She is alert and oriented to person, place, and time. Mental status is at baseline.   Psychiatric:         Mood and Affect: Mood normal.         Behavior: Behavior normal.         Assessment/Plan   Problem List Items Addressed This Visit             ICD-10-CM    Fatigue R53.83    Relevant Orders    Tsh With Reflex To Free T4 If Abnormal     Other Visit Diagnoses         Codes    Hot flashes    -  Primary R23.2    Relevant Orders    Tsh With Reflex To Free T4 If Abnormal    CBC and Auto Differential    Cough, unspecified type     R05.9    Relevant Orders    XR chest 2 views            Medicare Wellness Examination Done  -  Discussed healthy diet and regular exercise.    -  Physical exam overall unremarkable. Immunizations reviewed and updated accordingly. Healthy lifestyle choices discussed (tobacco avoidance, appropriate alcohol use, avoidance of illicit substances).   -  Patient is wearing seatbelt.   -  Screening lab work ordered as indicated.    -  Age appropriate screening tests reviewed with patient.       IMPRESSIONS/PLAN:      Night sweats  - Ordered TSH.   Ordered CBC with differential.  Does have history of TB as a child- check X-ray    Subacute Cough  - Clear to auscultation bilaterally.  Chest Xray showed emphysematous changes, with no acute abnormality 5/2023, ordered repeat Chest Xray as symptoms ongoing.     /70 in the office today.     Neck Pain   - I have advised the patient it is okay to take Tylenol up to a maximum dosage of 3000 mg. Have recommended a regimen of two 325mg tablets in the morning and two tablets at night as needed. The patient will continue with physical therapy.     Depression   - Stable. Patient taking escitalopram 10 mg one tablets every day      Osteoporosis   - DEXA performed 2/1/2022 revealed T-score of -3.8 indicative of osteoporosis. Follows with Dr. Carranza. Previously on Alendronate 70 mg weekly.        Hypercalcemia   - Calcium returned to wnl of 10.3 per 5/2022 CMP. elevated at 10.4 per renal function panel 12/2021, slightly decreased from 10.8 - 9/2021. Follows up with Dr. Ulloa from General Surgery for parathyroid adenoma. Will monitor for now.      Anemia   - Last Hb 11.7 per 6/2023, trending up.  Folate, Ferritin, Iron and TIBC all wnl.      Health Maintenance   - Routine labs 2/2024.     Follow up in 2-3 months, call sooner if needed.       Scribe Attestation  By signing my name below, I, Alyce Zhao, [place]   attest that this documentation has been prepared under the direction and in the presence of Jone Yo DO.   Alyce Zhao 03/07/24 11:14 AM

## 2024-03-14 ENCOUNTER — OFFICE VISIT (OUTPATIENT)
Dept: UROLOGY | Facility: CLINIC | Age: 89
End: 2024-03-14
Payer: MEDICARE

## 2024-03-14 VITALS — HEART RATE: 77 BPM | TEMPERATURE: 97.1 F | DIASTOLIC BLOOD PRESSURE: 79 MMHG | SYSTOLIC BLOOD PRESSURE: 166 MMHG

## 2024-03-14 DIAGNOSIS — R33.9 URINARY RETENTION: ICD-10-CM

## 2024-03-14 DIAGNOSIS — R31.0 GROSS HEMATURIA: ICD-10-CM

## 2024-03-14 DIAGNOSIS — R31.9 HEMATURIA, UNSPECIFIED TYPE: Primary | ICD-10-CM

## 2024-03-14 LAB
APPEARANCE UR: CLEAR
BACTERIA #/AREA URNS AUTO: NORMAL /HPF
BILIRUB UR STRIP.AUTO-MCNC: NEGATIVE MG/DL
COLOR UR: YELLOW
GLUCOSE UR STRIP.AUTO-MCNC: NEGATIVE MG/DL
KETONES UR STRIP.AUTO-MCNC: NEGATIVE MG/DL
LEUKOCYTE ESTERASE UR QL STRIP.AUTO: ABNORMAL
NITRITE UR QL STRIP.AUTO: NEGATIVE
PH UR STRIP.AUTO: 6 [PH]
POC APPEARANCE, URINE: CLEAR
POC BILIRUBIN, URINE: NEGATIVE
POC BLOOD, URINE: ABNORMAL
POC COLOR, URINE: YELLOW
POC GLUCOSE, URINE: NEGATIVE MG/DL
POC KETONES, URINE: NEGATIVE MG/DL
POC LEUKOCYTES, URINE: NEGATIVE
POC NITRITE,URINE: NEGATIVE
POC PH, URINE: 6.5 PH
POC PROTEIN, URINE: NEGATIVE MG/DL
POC SPECIFIC GRAVITY, URINE: 1.02
POC UROBILINOGEN, URINE: 0.2 EU/DL
PROT UR STRIP.AUTO-MCNC: NEGATIVE MG/DL
RBC # UR STRIP.AUTO: NEGATIVE /UL
RBC #/AREA URNS AUTO: NORMAL /HPF
SP GR UR STRIP.AUTO: 1.01
UROBILINOGEN UR STRIP.AUTO-MCNC: <2 MG/DL
WBC #/AREA URNS AUTO: NORMAL /HPF

## 2024-03-14 PROCEDURE — 99204 OFFICE O/P NEW MOD 45 MIN: CPT | Performed by: NURSE PRACTITIONER

## 2024-03-14 PROCEDURE — 3078F DIAST BP <80 MM HG: CPT | Performed by: NURSE PRACTITIONER

## 2024-03-14 PROCEDURE — 81003 URINALYSIS AUTO W/O SCOPE: CPT | Performed by: NURSE PRACTITIONER

## 2024-03-14 PROCEDURE — 1159F MED LIST DOCD IN RCRD: CPT | Performed by: NURSE PRACTITIONER

## 2024-03-14 PROCEDURE — 87086 URINE CULTURE/COLONY COUNT: CPT

## 2024-03-14 PROCEDURE — 1157F ADVNC CARE PLAN IN RCRD: CPT | Performed by: NURSE PRACTITIONER

## 2024-03-14 PROCEDURE — 81001 URINALYSIS AUTO W/SCOPE: CPT

## 2024-03-14 PROCEDURE — 3077F SYST BP >= 140 MM HG: CPT | Performed by: NURSE PRACTITIONER

## 2024-03-14 PROCEDURE — 1036F TOBACCO NON-USER: CPT | Performed by: NURSE PRACTITIONER

## 2024-03-14 NOTE — PROGRESS NOTES
Subjective   Patient ID: Heather Villalba is a 89 y.o. female who presents for Blood in Urine.  Presents for eval of gross hematuria which began late Jan/early Feb for approximately 4 days. Denies associated pain. Resolved spontaneously.     Patient's daughter believes she may have had gross hematuria approximately 5-6 years ago. Saw urology who thought she may have papillary necrosis which resolved. No further treatment.     Never smoked. Significant secondhand smoke exposure. Denies familial  malignancy. On baby ASA, no blood thinners.         Review of Systems   All other systems reviewed and are negative.      Objective   Physical Exam  Vitals reviewed.     Constitutional: Patient generally appears stated age. Good nutrition. No deformities. Good attention to grooming.  Neck: No neck masses. Good symmetry. No crepitus. Normal thyroid size and consistency with no masses.  Respiratory: Normal respiratory effort. No intercostal retractions. No use of accessory muscles.  Cardiovascular: Examination of the peripheral vascular system reveals no swelling or varicosities with good pulses. Normal extremity temperature, no edema or tenderness.  Abdomen: Examination of the abdomen reveals no masses or tenderness. No hernias detected. Normal liver and spleen size.   Lymphatic: No palpable lymph nodes in the neck, axilla, groin or other locations  Skin: Inspection of the skin reveals no rashes, lesions, ulcers.  Neurologic/Psychiatric: Oriented to time, place and person. Normal mood and affect with no depression, anxiety, or agitation.  Ambulates with cane     Office Visit on 03/14/2024   Component Date Value Ref Range Status    POC Color, Urine 03/14/2024 Yellow  Straw, Yellow, Light-Yellow Final    POC Appearance, Urine 03/14/2024 Clear  Clear Final    POC Glucose, Urine 03/14/2024 NEGATIVE  NEGATIVE mg/dl Final    POC Bilirubin, Urine 03/14/2024 NEGATIVE  NEGATIVE Final    POC Ketones, Urine 03/14/2024 NEGATIVE  NEGATIVE  mg/dl Final    POC Specific Gravity, Urine 03/14/2024 1.020  1.005 - 1.035 Final    POC Blood, Urine 03/14/2024 TRACE-Intact (A)  NEGATIVE Final    POC PH, Urine 03/14/2024 6.5  No Reference Range Established PH Final    POC Protein, Urine 03/14/2024 NEGATIVE  NEGATIVE, 30 (1+) mg/dl Final    POC Urobilinogen, Urine 03/14/2024 0.2  0.2, 1.0 EU/DL Final    Poc Nitrite, Urine 03/14/2024 NEGATIVE  NEGATIVE Final    POC Leukocytes, Urine 03/14/2024 NEGATIVE  NEGATIVE Final      Assessment/Plan   Diagnoses and all orders for this visit:  Hematuria, unspecified type  -     Referral to Urology  -     Post-Void Residual  -     POCT UA Automated manually resulted  -     Urinalysis with Reflex Culture and Microscopic; Future  -     Basic metabolic panel; Future  -     CT urography w 3D volume rendered imaging; Future  Urinary retention  -     Post-Void Residual  -     Urinalysis with Reflex Culture and Microscopic; Future  -     Basic metabolic panel; Future  -     CT urography w 3D volume rendered imaging; Future  Gross hematuria  -     Urinalysis with Reflex Culture and Microscopic; Future  -     Basic metabolic panel; Future  -     CT urography w 3D volume rendered imaging; Future    Reviewed AUA guidelines for gross hematuria eval. Would like to proceed. Very good performance status at age 89. Will schedule accordingly.        Fiona Joseph, SHADI-CNP 03/14/24 2:52 PM

## 2024-03-15 LAB
BACTERIA UR CULT: NO GROWTH
HOLD SPECIMEN: NORMAL

## 2024-03-19 ENCOUNTER — DOCUMENTATION (OUTPATIENT)
Dept: UROLOGY | Facility: CLINIC | Age: 89
End: 2024-03-19
Payer: MEDICARE

## 2024-03-19 ENCOUNTER — TELEPHONE (OUTPATIENT)
Dept: UROLOGY | Facility: CLINIC | Age: 89
End: 2024-03-19
Payer: MEDICARE

## 2024-03-20 DIAGNOSIS — G47.00 INSOMNIA, UNSPECIFIED TYPE: ICD-10-CM

## 2024-03-20 DIAGNOSIS — H16.223 KERATOCONJUNCTIVITIS SICCA OF BOTH EYES NOT DUE TO SJOGREN'S SYNDROME: ICD-10-CM

## 2024-03-20 RX ORDER — CYCLOSPORINE 0.5 MG/ML
1 EMULSION OPHTHALMIC EVERY 12 HOURS
Qty: 60 EACH | Refills: 2 | OUTPATIENT
Start: 2024-03-20

## 2024-03-20 RX ORDER — NORTRIPTYLINE HYDROCHLORIDE 10 MG/1
10 CAPSULE ORAL NIGHTLY
Qty: 90 CAPSULE | Refills: 2 | Status: SHIPPED | OUTPATIENT
Start: 2024-03-20

## 2024-03-25 DIAGNOSIS — H16.223 KERATOCONJUNCTIVITIS SICCA OF BOTH EYES NOT DUE TO SJOGREN'S SYNDROME: ICD-10-CM

## 2024-03-25 RX ORDER — CYCLOSPORINE 0.5 MG/ML
1 EMULSION OPHTHALMIC EVERY 12 HOURS
Qty: 60 EACH | Refills: 2 | OUTPATIENT
Start: 2024-03-25

## 2024-04-08 DIAGNOSIS — H16.223 KERATOCONJUNCTIVITIS SICCA OF BOTH EYES NOT DUE TO SJOGREN'S SYNDROME: ICD-10-CM

## 2024-04-08 RX ORDER — CYCLOSPORINE 0.5 MG/ML
1 EMULSION OPHTHALMIC EVERY 12 HOURS
Qty: 60 EACH | Refills: 0 | Status: SHIPPED | OUTPATIENT
Start: 2024-04-08

## 2024-05-10 ENCOUNTER — APPOINTMENT (OUTPATIENT)
Dept: UROLOGY | Facility: CLINIC | Age: 89
End: 2024-05-10
Payer: MEDICARE

## 2024-05-31 ENCOUNTER — LAB (OUTPATIENT)
Dept: LAB | Facility: LAB | Age: 89
End: 2024-05-31
Payer: MEDICARE

## 2024-05-31 ENCOUNTER — OFFICE VISIT (OUTPATIENT)
Dept: PRIMARY CARE | Facility: CLINIC | Age: 89
End: 2024-05-31
Payer: MEDICARE

## 2024-05-31 VITALS
WEIGHT: 113 LBS | OXYGEN SATURATION: 98 % | RESPIRATION RATE: 16 BRPM | HEART RATE: 79 BPM | SYSTOLIC BLOOD PRESSURE: 140 MMHG | BODY MASS INDEX: 20.02 KG/M2 | TEMPERATURE: 97.8 F | DIASTOLIC BLOOD PRESSURE: 70 MMHG

## 2024-05-31 DIAGNOSIS — N39.0 URINARY TRACT INFECTION WITHOUT HEMATURIA, SITE UNSPECIFIED: ICD-10-CM

## 2024-05-31 DIAGNOSIS — R53.83 OTHER FATIGUE: Primary | ICD-10-CM

## 2024-05-31 LAB
APPEARANCE UR: ABNORMAL
BILIRUB UR STRIP.AUTO-MCNC: NEGATIVE MG/DL
COLOR UR: ABNORMAL
GLUCOSE UR STRIP.AUTO-MCNC: NORMAL MG/DL
KETONES UR STRIP.AUTO-MCNC: NEGATIVE MG/DL
LEUKOCYTE ESTERASE UR QL STRIP.AUTO: NEGATIVE
MUCOUS THREADS #/AREA URNS AUTO: ABNORMAL /LPF
NITRITE UR QL STRIP.AUTO: NEGATIVE
PH UR STRIP.AUTO: 5.5 [PH]
PROT UR STRIP.AUTO-MCNC: ABNORMAL MG/DL
RBC # UR STRIP.AUTO: NEGATIVE /UL
RBC #/AREA URNS AUTO: >20 /HPF
SP GR UR STRIP.AUTO: 1.01
SQUAMOUS #/AREA URNS AUTO: ABNORMAL /HPF
UROBILINOGEN UR STRIP.AUTO-MCNC: NORMAL MG/DL
WBC #/AREA URNS AUTO: ABNORMAL /HPF
WBC CLUMPS #/AREA URNS AUTO: ABNORMAL /HPF

## 2024-05-31 PROCEDURE — 3078F DIAST BP <80 MM HG: CPT | Performed by: INTERNAL MEDICINE

## 2024-05-31 PROCEDURE — 3077F SYST BP >= 140 MM HG: CPT | Performed by: INTERNAL MEDICINE

## 2024-05-31 PROCEDURE — 1159F MED LIST DOCD IN RCRD: CPT | Performed by: INTERNAL MEDICINE

## 2024-05-31 PROCEDURE — 1160F RVW MEDS BY RX/DR IN RCRD: CPT | Performed by: INTERNAL MEDICINE

## 2024-05-31 PROCEDURE — 1036F TOBACCO NON-USER: CPT | Performed by: INTERNAL MEDICINE

## 2024-05-31 PROCEDURE — 81001 URINALYSIS AUTO W/SCOPE: CPT

## 2024-05-31 PROCEDURE — 99214 OFFICE O/P EST MOD 30 MIN: CPT | Performed by: INTERNAL MEDICINE

## 2024-05-31 PROCEDURE — 1157F ADVNC CARE PLAN IN RCRD: CPT | Performed by: INTERNAL MEDICINE

## 2024-05-31 PROCEDURE — G2211 COMPLEX E/M VISIT ADD ON: HCPCS | Performed by: INTERNAL MEDICINE

## 2024-05-31 ASSESSMENT — ENCOUNTER SYMPTOMS
DYSURIA: 0
SHORTNESS OF BREATH: 0
HEMATURIA: 0
MYALGIAS: 1

## 2024-05-31 NOTE — PROGRESS NOTES
Patient here for a 2 month follow up    Subjective   Patient ID: Heather Villalba is a 89 y.o. female who presents for Follow-up.  She is accompanied by a female relative who offers some of the history.    The patient is concerned about worsening memory, and believes that this is exacerbated with poor water intake.  She notes difficulty with remembering appointments and medications.  She endorses urinary urgency, but denies any dysuria.  The previous hematuria has resolved, but the urination is darker in color as compared to baseline.  The patient has established care with Fiona CHICAS from Urology in 3/2024, and her relative recalls that they were pleased with the care she received.  The patient does not recall this visit with Urology.      The patient reports a recent episode of blepharitis which is subsiding.     The patient's relative believes that she is still taking escitalopram 10mg once daily on a consistent basis.    There have been no changes to the patient's medications.    The patient notes diffuse myalgia.     The patient denies any dyspnea, chest pressure, or chest pain.     The patient has had an ostomy since 2012, and states that this is working well.      Review of Systems   Respiratory:  Negative for shortness of breath.    Cardiovascular:  Negative for chest pain.   Genitourinary:  Positive for urgency. Negative for dysuria and hematuria.   Musculoskeletal:  Positive for myalgias.   Neurological:         Positive for difficulty with memory.       Objective   Physical Exam  Constitutional:       Appearance: Normal appearance.   Neck:      Vascular: No carotid bruit.   Cardiovascular:      Rate and Rhythm: Normal rate and regular rhythm.      Heart sounds: Normal heart sounds.   Pulmonary:      Effort: Pulmonary effort is normal.      Breath sounds: Normal breath sounds.   Abdominal:      General: Bowel sounds are normal.      Palpations: Abdomen is soft.      Tenderness: There is no  abdominal tenderness.   Skin:     General: Skin is warm and dry.   Neurological:      General: No focal deficit present.      Mental Status: She is alert and oriented to person, place, and time. Mental status is at baseline.   Psychiatric:         Mood and Affect: Mood normal.         Behavior: Behavior normal.         Assessment/Plan   Problem List Items Addressed This Visit             ICD-10-CM    Fatigue - Primary R53.83     Other Visit Diagnoses         Codes    Urinary tract infection without hematuria, site unspecified     N39.0    Relevant Orders    Urinalysis with Reflex Microscopic    Urine Culture            IMPRESSIONS/PLAN:       Memory Loss  - IO MMSE 26/30 per 5/31/2024.  Ordered Urinalysis with reflex microscopy and Urine Culture as below.  Monitor for now.  Advised patient to try audio books as vision prevents her from doing crossword puzzles.    Urinary Frequency  - Ordered Urinalysis with reflex microscopy and Urine Culture as below.    /70 in the office today.     Neck Pain   - I have advised the patient it is okay to take Tylenol up to a maximum dosage of 3000 mg. Have recommended a regimen of two 325mg tablets in the morning and two tablets at night as needed. The patient will continue with physical therapy.     Depression   - Stable. Patient taking escitalopram 10 mg one tablets every day.     Osteoporosis   - DEXA performed 2/1/2022 revealed T-score of -3.8 indicative of osteoporosis. Follows with Dr. Carranza. Previously on Alendronate 70 mg weekly.        Hypercalcemia   - Calcium returned to wnl of 10.3 per 5/2022 CMP. elevated at 10.4 per renal function panel 12/2021, slightly decreased from 10.8 - 9/2021. Follows up with Dr. Ulloa from General Surgery for parathyroid adenoma. Will monitor for now.     Anemia   - Last Hb 11.7 per 6/2023, trending up.  Folate, Ferritin, Iron and TIBC all wnl.      Health Maintenance   - Routine labs 2/2024.     Follow up in 2-3 months, call sooner if  needed.       Scribe Attestation  By signing my name below, I, Dimas Chery   attest that this documentation has been prepared under the direction and in the presence of Jone Yo DO.   Alyce Zhao 05/31/24 11:08 AM

## 2024-06-04 ENCOUNTER — TELEPHONE (OUTPATIENT)
Dept: PRIMARY CARE | Facility: CLINIC | Age: 89
End: 2024-06-04
Payer: MEDICARE

## 2024-06-04 NOTE — TELEPHONE ENCOUNTER
----- Message from Jone Yo DO sent at 6/4/2024  8:11 AM EDT -----  Please let her know aside from some red blood cells still in the urine- the UA looks OK.  Would recommend she follow-up with Caprice Joseph.

## 2024-07-08 ENCOUNTER — TELEPHONE (OUTPATIENT)
Dept: PRIMARY CARE | Facility: CLINIC | Age: 89
End: 2024-07-08
Payer: MEDICARE

## 2024-07-08 NOTE — TELEPHONE ENCOUNTER
Roque Felix/[facility] [place]/checking on status on paperwork that was sent over. FAX: [phone]/Please advise when faxed.

## 2024-07-10 ENCOUNTER — APPOINTMENT (OUTPATIENT)
Dept: OPHTHALMOLOGY | Facility: CLINIC | Age: 89
End: 2024-07-10
Payer: MEDICARE

## 2024-07-10 DIAGNOSIS — H40.10X2 ADVANCED OPEN-ANGLE GLAUCOMA, MODERATE STAGE: ICD-10-CM

## 2024-07-10 DIAGNOSIS — H47.291 PARTIAL OPTIC ATROPHY OF RIGHT EYE: Primary | ICD-10-CM

## 2024-07-10 DIAGNOSIS — M35.3 PMR (POLYMYALGIA RHEUMATICA) (MULTI): ICD-10-CM

## 2024-07-10 DIAGNOSIS — H01.019 ULCERATIVE BLEPHARITIS OF BOTH UPPER AND LOWER EYELID: ICD-10-CM

## 2024-07-10 DIAGNOSIS — H52.13 MYOPIA OF BOTH EYES: ICD-10-CM

## 2024-07-10 DIAGNOSIS — M31.6 GIANT CELL ARTERITIS SYNDROME (MULTI): ICD-10-CM

## 2024-07-10 DIAGNOSIS — H27.112 SUBLUXATION OF LEFT LENS: ICD-10-CM

## 2024-07-10 DIAGNOSIS — H35.372 EPIRETINAL MEMBRANE (ERM) OF LEFT EYE: ICD-10-CM

## 2024-07-10 DIAGNOSIS — H18.451 SALZMANN'S NODULAR DYSTROPHY OF RIGHT EYE: ICD-10-CM

## 2024-07-10 PROCEDURE — 92134 CPTRZ OPH DX IMG PST SGM RTA: CPT | Mod: BILATERAL PROCEDURE | Performed by: OPHTHALMOLOGY

## 2024-07-10 PROCEDURE — 99213 OFFICE O/P EST LOW 20 MIN: CPT | Performed by: OPHTHALMOLOGY

## 2024-07-10 ASSESSMENT — SLIT LAMP EXAM - LIDS
COMMENTS: GOOD POSITION, 1+ MGD
COMMENTS: GOOD POSITION, 1+ MGD

## 2024-07-10 ASSESSMENT — REFRACTION_WEARINGRX
OS_SPHERE: PLANO
OD_AXIS: 119
OD_SPHERE: -1.25
OS_CYLINDER: -3.00
OD_CYLINDER: -2.75
OS_AXIS: 095
OD_ADD: +2.75
OS_ADD: +2.75

## 2024-07-10 ASSESSMENT — VISUAL ACUITY
OD_PH_CC: 20/60
OS_CC: NLP
OD_CC: 20/70
CORRECTION_TYPE: GLASSES
METHOD: SNELLEN - LINEAR

## 2024-07-10 ASSESSMENT — CUP TO DISC RATIO
OS_RATIO: NO VIEW
OD_RATIO: 0.7

## 2024-07-10 ASSESSMENT — PACHYMETRY
OD_CT(UM): 530
OS_CT(UM): 584

## 2024-07-10 ASSESSMENT — TONOMETRY
OS_IOP_MMHG: 12
IOP_METHOD: GOLDMANN APPLANATION
OD_IOP_MMHG: 14

## 2024-07-10 ASSESSMENT — ENCOUNTER SYMPTOMS: EYES NEGATIVE: 1

## 2024-07-10 ASSESSMENT — CONF VISUAL FIELD: COMMENTS: CONSTRICTED

## 2024-07-10 ASSESSMENT — EXTERNAL EXAM - LEFT EYE: OS_EXAM: NORMAL

## 2024-07-10 ASSESSMENT — EXTERNAL EXAM - RIGHT EYE: OD_EXAM: NORMAL

## 2024-07-10 NOTE — PROGRESS NOTES
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  Subluxation of left lens  Salzmann's nodular dystrophy of right eye  Partial optic atrophy of right eye  Myopia of both eyes  Advanced open-angle glaucoma, moderate stage  PMR (polymyalgia rheumatica) (Multi)     Impression       1 H04.123 Dry eye syndrome of bilateral lacrimal glands-Stable     2 H02.88A Meibomian gland dysfunction (mgd) of upper and lower eyelid of right eye-Stable     3 H02.88B Meibomian gland dysfunction (mgd) of upper and lower eyelid of left eye-Stable     4 H16.223 Keratoconjunctivitis sicca of both eyes not due to Sjogren's syndrome-Stable     5 H18.451 Salzmann's nodular dystrophy of right eye-Stable     6 H43.811 Pvd (posterior vitreous detachment), right eye-Stable     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, left eye-Stable     8 H35.342 Macular hole, left eye-Stable     9 H18.51 Fuchs' endothelial dystrophy-Stable     10 H27.112 Subluxation Of Left Lens-Stable     11 H17.9 Corneal Scars, Both Eyes-Stable     12 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     13 H52.11 Myopia of right eye-New                   1 Amd chroidal atrophy OD          2 RRD OS old fixed  np value in surgery            3 Optic nerve pallor OD 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 Lemus for further optic nerve evaluation  Will obtain labs: CBC/CMP/ESR/CRP

## 2024-07-18 ENCOUNTER — TELEPHONE (OUTPATIENT)
Dept: PRIMARY CARE | Facility: CLINIC | Age: 89
End: 2024-07-18
Payer: MEDICARE

## 2024-07-18 NOTE — TELEPHONE ENCOUNTER
Pt will be moving into assistant living and they are looking for Nursing care and therapy when she moves in and wanted to know if Dr. Yo will continue to follow her.   [facility]  [phone]

## 2024-07-22 ENCOUNTER — TELEPHONE (OUTPATIENT)
Dept: PRIMARY CARE | Facility: CLINIC | Age: 89
End: 2024-07-22
Payer: MEDICARE

## 2024-07-22 NOTE — TELEPHONE ENCOUNTER
Patient's son Franklin called, patient is currently going to be moved from [facility] to [facility] in [place]. Patient's family is being told that they have to pay for an additional 30-days. Patient is requiring more elevated care than [facility] can provide, so the family is trying to move the patient next week. Patient states that Diana from [facility] states that they need a letter indicating that she needs to break her lease sooner, as patient is a fall risk, and is blind in one eye. Diana PH: [phone] and FAX: [phone]. Please call patient's son back to advise if a letter can be written on patient's/family behalf.

## 2024-07-29 ENCOUNTER — TELEPHONE (OUTPATIENT)
Dept: PRIMARY CARE | Facility: CLINIC | Age: 89
End: 2024-07-29
Payer: MEDICARE

## 2024-07-29 NOTE — TELEPHONE ENCOUNTER
Pt is being transferred from [facility] to [facility] - Maty from [facility] is asking for medication list/ most recent progress notes - fax to [phone] - also would like to know if Dr Yo would like physical therapy and are there any dietary limitations - please call and advise

## 2024-07-31 ENCOUNTER — TELEPHONE (OUTPATIENT)
Dept: PRIMARY CARE | Facility: CLINIC | Age: 89
End: 2024-07-31
Payer: MEDICARE

## 2024-08-06 ENCOUNTER — APPOINTMENT (OUTPATIENT)
Dept: PRIMARY CARE | Facility: CLINIC | Age: 89
End: 2024-08-06
Payer: MEDICARE

## 2024-08-07 ENCOUNTER — APPOINTMENT (OUTPATIENT)
Dept: PRIMARY CARE | Facility: CLINIC | Age: 89
End: 2024-08-07
Payer: MEDICARE

## 2024-08-12 ENCOUNTER — TELEPHONE (OUTPATIENT)
Dept: PRIMARY CARE | Facility: CLINIC | Age: 89
End: 2024-08-12
Payer: MEDICARE

## 2024-08-12 NOTE — TELEPHONE ENCOUNTER
8/12/24 called [phone] was transfer to a voicemail. Left message that Dr. Yo has agreed to verbal order for nursing, physical therapy, occupational therapy and speech.   If anything further is needed contact the office

## 2024-08-12 NOTE — TELEPHONE ENCOUNTER
Virginie from [facility] home care called asking if she could get verbal order for nursing, physical therapy, occupational therapy and speech.    Fax [phone]

## 2024-08-13 DIAGNOSIS — G47.00 INSOMNIA, UNSPECIFIED TYPE: ICD-10-CM

## 2024-08-13 DIAGNOSIS — K21.9 GASTROESOPHAGEAL REFLUX DISEASE WITHOUT ESOPHAGITIS: Primary | ICD-10-CM

## 2024-08-13 RX ORDER — OMEPRAZOLE 20 MG/1
20 CAPSULE, DELAYED RELEASE ORAL DAILY
Qty: 90 CAPSULE | Refills: 2 | Status: SHIPPED | OUTPATIENT
Start: 2024-08-13

## 2024-08-13 RX ORDER — NORTRIPTYLINE HYDROCHLORIDE 10 MG/1
10 CAPSULE ORAL NIGHTLY
Qty: 90 CAPSULE | Refills: 3 | Status: SHIPPED | OUTPATIENT
Start: 2024-08-13

## 2024-08-14 ENCOUNTER — OFFICE VISIT (OUTPATIENT)
Dept: PRIMARY CARE | Facility: CLINIC | Age: 89
End: 2024-08-14
Payer: MEDICARE

## 2024-08-14 VITALS
WEIGHT: 112 LBS | OXYGEN SATURATION: 96 % | BODY MASS INDEX: 19.84 KG/M2 | DIASTOLIC BLOOD PRESSURE: 70 MMHG | TEMPERATURE: 97.4 F | SYSTOLIC BLOOD PRESSURE: 160 MMHG | HEART RATE: 80 BPM | RESPIRATION RATE: 16 BRPM

## 2024-08-14 DIAGNOSIS — Z93.9 HISTORY OF CREATION OF OSTOMY (MULTI): ICD-10-CM

## 2024-08-14 DIAGNOSIS — F32.0 CURRENT MILD EPISODE OF MAJOR DEPRESSIVE DISORDER, UNSPECIFIED WHETHER RECURRENT (CMS-HCC): ICD-10-CM

## 2024-08-14 DIAGNOSIS — E21.3 HYPERPARATHYROIDISM (MULTI): ICD-10-CM

## 2024-08-14 DIAGNOSIS — K51.919 ULCERATIVE COLITIS WITH COMPLICATION, UNSPECIFIED LOCATION (MULTI): ICD-10-CM

## 2024-08-14 DIAGNOSIS — N18.30 STAGE 3 CHRONIC KIDNEY DISEASE, UNSPECIFIED WHETHER STAGE 3A OR 3B CKD (MULTI): Primary | ICD-10-CM

## 2024-08-14 PROCEDURE — 1157F ADVNC CARE PLAN IN RCRD: CPT | Performed by: INTERNAL MEDICINE

## 2024-08-14 PROCEDURE — 1036F TOBACCO NON-USER: CPT | Performed by: INTERNAL MEDICINE

## 2024-08-14 PROCEDURE — 1123F ACP DISCUSS/DSCN MKR DOCD: CPT | Performed by: INTERNAL MEDICINE

## 2024-08-14 PROCEDURE — 3077F SYST BP >= 140 MM HG: CPT | Performed by: INTERNAL MEDICINE

## 2024-08-14 PROCEDURE — 3078F DIAST BP <80 MM HG: CPT | Performed by: INTERNAL MEDICINE

## 2024-08-14 PROCEDURE — 99214 OFFICE O/P EST MOD 30 MIN: CPT | Performed by: INTERNAL MEDICINE

## 2024-08-14 PROCEDURE — 1158F ADVNC CARE PLAN TLK DOCD: CPT | Performed by: INTERNAL MEDICINE

## 2024-08-14 PROCEDURE — 1159F MED LIST DOCD IN RCRD: CPT | Performed by: INTERNAL MEDICINE

## 2024-08-14 PROCEDURE — G2211 COMPLEX E/M VISIT ADD ON: HCPCS | Performed by: INTERNAL MEDICINE

## 2024-08-14 RX ORDER — ACETAMINOPHEN 500 MG
TABLET ORAL EVERY 6 HOURS PRN
COMMUNITY

## 2024-08-14 ASSESSMENT — PATIENT HEALTH QUESTIONNAIRE - PHQ9
SUM OF ALL RESPONSES TO PHQ9 QUESTIONS 1 AND 2: 0
1. LITTLE INTEREST OR PLEASURE IN DOING THINGS: NOT AT ALL
2. FEELING DOWN, DEPRESSED OR HOPELESS: NOT AT ALL

## 2024-08-14 ASSESSMENT — ENCOUNTER SYMPTOMS: HEADACHES: 1

## 2024-08-14 NOTE — PROGRESS NOTES
Patient here for a follow up    Subjective   Patient ID: Heather Villalba is a 89 y.o. female who presents for Follow-up.    The patient is concerned about memory loss.  She is currently taking Vitamin B12 5000 mcg once daily, and inquires about an increased dosage.    The patient reports that she is not regularly given her eye drops while at the Assisted Living Facility.     The patient is currently maintained with nortriptyline 10 mg once nightly and melatonin 10 mg once nightly, and is tolerating this regimen well.  However, she notes constant frontal headache and suspects that she is not regularly given the nortriptyline.      The patient notes a good appetite, and is eating all the meals she is given.  Her weight has remained relatively steady.  The patient continues to take loperamide as prescribed, and finds that this is helping.    The patient has started following with Physical Therapy, and finds that this has been beneficial.      Review of Systems   Neurological:  Positive for headaches.        Positive for difficulty with memory.       Objective   Physical Exam  Constitutional:       Appearance: Normal appearance.   Neck:      Vascular: No carotid bruit.   Cardiovascular:      Rate and Rhythm: Normal rate and regular rhythm.      Heart sounds: Normal heart sounds.   Pulmonary:      Effort: Pulmonary effort is normal.      Breath sounds: Normal breath sounds.   Abdominal:      General: Bowel sounds are normal.      Palpations: Abdomen is soft.      Tenderness: There is no abdominal tenderness.   Skin:     General: Skin is warm and dry.   Neurological:      General: No focal deficit present.      Mental Status: She is alert and oriented to person, place, and time. Mental status is at baseline.   Psychiatric:         Mood and Affect: Mood normal.         Behavior: Behavior normal.       Assessment/Plan   Problem List Items Addressed This Visit             ICD-10-CM    Current mild episode of major depressive  disorder (CMS-HCC) F32.0    History of creation of ostomy (Multi) Z93.9    Hyperparathyroidism (Multi) E21.3    Ulcerative colitis with complication, unspecified location (Multi) K51.919    Stage 3 chronic kidney disease, unspecified whether stage 3a or 3b CKD (Multi) - Primary N18.30       IMPRESSIONS/PLAN:        Glaucoma  - Patient MUST take latanoprost 0.005% ophthalmic solution as 1 drop in each eye nightly as prescribed.  She also should be taking her cyclosporine drops as prescribed.     Cervicogenic Headache  - Patient MUST receive nortriptyline 10mg once nightly as prescribed.  Call the clinic if symptoms persist or worsen.     Memory Loss  - IO MMSE 26/30 per 5/31/2024.  Monitor for now.  Advised patient to try audio books as vision prevents her from doing crossword puzzles.  Increased Vitamin B12 to 5000 mcg once daily.     /70 in the office today.     Neck Pain   - I have advised the patient it is okay to take Tylenol up to a maximum dosage of 3000 mg. Have recommended a regimen of two 325mg tablets in the morning and two tablets at night as needed. The patient will continue with physical therapy.     Depression   - Stable. Patient taking escitalopram 10 mg one tablets every day.     Osteoporosis   - DEXA performed 2/1/2022 revealed T-score of -3.8 indicative of osteoporosis. Follows with Dr. Carranza. Previously on Alendronate 70 mg weekly.        Hypercalcemia   - Calcium returned to wnl of 10.3 per 5/2022 CMP. elevated at 10.4 per renal function panel 12/2021, slightly decreased from 10.8 - 9/2021. Follows up with Dr. Ulloa from General Surgery for parathyroid adenoma. Will monitor for now.      Anemia   - Last Hb 11.7 per 6/2023, trending up.  Folate, Ferritin, Iron and TIBC all wnl.      Health Maintenance   - Routine labs 2/2024.  Routine labs also pending.     Follow up in 3-6 months, call sooner if needed.       Scribe Attestation  By signing my name below, Alyce ROJAS Scribe    attest that this documentation has been prepared under the direction and in the presence of Jone Yo DO.   Alyce Zhao 08/14/24 2:49 PM

## 2024-08-15 ENCOUNTER — TELEPHONE (OUTPATIENT)
Dept: PRIMARY CARE | Facility: CLINIC | Age: 89
End: 2024-08-15
Payer: MEDICARE

## 2024-08-15 NOTE — TELEPHONE ENCOUNTER
Virginie from [facility] home [facility] called for Heather. Looking for appt notes from 8/14 visit. Would like them fax over [phone]

## 2024-08-19 ENCOUNTER — TELEPHONE (OUTPATIENT)
Dept: PRIMARY CARE | Facility: CLINIC | Age: 89
End: 2024-08-19
Payer: MEDICARE

## 2024-08-19 NOTE — TELEPHONE ENCOUNTER
LISA Montana from [facility] called to advise pt has Covid. She has a temp of 98.6 and has some congestion, weak and sleeping a lot otherwise is doing okay. They will quarantine pt for 5 days. Call with any further orders  [phone]

## 2024-08-22 ENCOUNTER — TELEPHONE (OUTPATIENT)
Dept: PRIMARY CARE | Facility: CLINIC | Age: 89
End: 2024-08-22
Payer: MEDICARE

## 2024-08-22 NOTE — TELEPHONE ENCOUNTER
Sharon from [facility] did evaluation looking to continue speech therapy for   1x a week for 4wks    Please call Sharon  [phone] can leave message

## 2024-08-30 ENCOUNTER — APPOINTMENT (OUTPATIENT)
Dept: PRIMARY CARE | Facility: CLINIC | Age: 89
End: 2024-08-30
Payer: MEDICARE

## 2024-09-06 ENCOUNTER — APPOINTMENT (OUTPATIENT)
Dept: NEUROLOGY | Facility: CLINIC | Age: 89
End: 2024-09-06
Payer: MEDICARE

## 2024-09-06 VITALS
HEIGHT: 63 IN | WEIGHT: 112 LBS | DIASTOLIC BLOOD PRESSURE: 70 MMHG | BODY MASS INDEX: 19.84 KG/M2 | SYSTOLIC BLOOD PRESSURE: 180 MMHG | HEART RATE: 72 BPM

## 2024-09-06 DIAGNOSIS — G44.86 CERVICOGENIC HEADACHE: ICD-10-CM

## 2024-09-06 PROCEDURE — 1157F ADVNC CARE PLAN IN RCRD: CPT | Performed by: STUDENT IN AN ORGANIZED HEALTH CARE EDUCATION/TRAINING PROGRAM

## 2024-09-06 PROCEDURE — 1159F MED LIST DOCD IN RCRD: CPT | Performed by: STUDENT IN AN ORGANIZED HEALTH CARE EDUCATION/TRAINING PROGRAM

## 2024-09-06 PROCEDURE — 1036F TOBACCO NON-USER: CPT | Performed by: STUDENT IN AN ORGANIZED HEALTH CARE EDUCATION/TRAINING PROGRAM

## 2024-09-06 PROCEDURE — 3078F DIAST BP <80 MM HG: CPT | Performed by: STUDENT IN AN ORGANIZED HEALTH CARE EDUCATION/TRAINING PROGRAM

## 2024-09-06 PROCEDURE — 99214 OFFICE O/P EST MOD 30 MIN: CPT | Performed by: STUDENT IN AN ORGANIZED HEALTH CARE EDUCATION/TRAINING PROGRAM

## 2024-09-06 PROCEDURE — 3077F SYST BP >= 140 MM HG: CPT | Performed by: STUDENT IN AN ORGANIZED HEALTH CARE EDUCATION/TRAINING PROGRAM

## 2024-09-06 RX ORDER — NORTRIPTYLINE HYDROCHLORIDE 10 MG/1
10 CAPSULE ORAL NIGHTLY
Qty: 90 CAPSULE | Refills: 3 | Status: SHIPPED | OUTPATIENT
Start: 2024-09-06

## 2024-09-06 ASSESSMENT — ANXIETY QUESTIONNAIRES
3. WORRYING TOO MUCH ABOUT DIFFERENT THINGS: NOT AT ALL
2. NOT BEING ABLE TO STOP OR CONTROL WORRYING: NOT AT ALL
GAD7 TOTAL SCORE: 0
5. BEING SO RESTLESS THAT IT IS HARD TO SIT STILL: NOT AT ALL
6. BECOMING EASILY ANNOYED OR IRRITABLE: NOT AT ALL
7. FEELING AFRAID AS IF SOMETHING AWFUL MIGHT HAPPEN: NOT AT ALL
4. TROUBLE RELAXING: NOT AT ALL
IF YOU CHECKED OFF ANY PROBLEMS ON THIS QUESTIONNAIRE, HOW DIFFICULT HAVE THESE PROBLEMS MADE IT FOR YOU TO DO YOUR WORK, TAKE CARE OF THINGS AT HOME, OR GET ALONG WITH OTHER PEOPLE: NOT DIFFICULT AT ALL
1. FEELING NERVOUS, ANXIOUS, OR ON EDGE: NOT AT ALL

## 2024-09-06 ASSESSMENT — ENCOUNTER SYMPTOMS
OCCASIONAL FEELINGS OF UNSTEADINESS: 0
LOSS OF SENSATION IN FEET: 0
DEPRESSION: 0

## 2024-09-06 NOTE — PROGRESS NOTES
Subjective   Heather Villalba is a 89 y.o.   female who is being followed for L cervicogenic headache.    Since last seen, patient states that headaches are daily but are not interfering with quality of life. Notes some problems with memory in general, however nothing that she is concerned about. Recently moved to an assisted living facility and PT schedule may have been somewhat disrupted during move. No clear side effects to nortriptyline.     Current Outpatient Medications   Medication Instructions    acetaminophen (Tylenol) 500 mg tablet oral, Every 6 hours PRN    cyanocobalamin (Vitamin B-12) 1,000 mcg tablet 1 tablet, oral, Daily    cycloSPORINE (Restasis) 0.05 % ophthalmic emulsion 1 drop, Both Eyes, Every 12 hours    escitalopram (LEXAPRO) 10 mg, oral, Daily    fluticasone (Flonase) 50 mcg/actuation nasal spray 1 spray, nasal, 2 times daily    guaiFENesin (Mucinex) 600 mg 12 hr tablet oral, Every 12 hours    L. acidophilus/Bifid. animalis 32 billion cell capsule oral    latanoprost (Xalatan) 0.005 % ophthalmic solution 1 drop, Both Eyes, Nightly    loperamide (Imodium) 2 mg capsule TAKE 3 CAPSULES BY MOUTH IN THE MORNING , THEN 2  CAPSULES AT LUNCH AND AT  DINNER    melatonin 5 mg capsule 1 capsule, oral, Nightly    multivitamin tablet 1 tablet, oral, Daily    nortriptyline (PAMELOR) 10 mg, oral, Nightly    OMEGA-3 ACID ETHYL ESTERS ORAL oral    omega-3 fatty acids-fish oil (One-Per-Day Omega-3) 684-1,200 mg capsule 1 capsule, oral, Daily    omeprazole (PRILOSEC) 20 mg, oral, Daily    ubidecarenone (COENZYME Q10, BULK, MISC) 1 capsule, oral, Daily       Assessment/Plan   Patient with L cervicogenic headache. Pain overall controlled with PT and nortriptyline without side effect. Will not increase medication given memory concerns at this time and pain does not appear to be affecting quality of life.     - continue nortriptyline 10mg at bedtime  - follow up PRN

## 2024-09-09 ENCOUNTER — APPOINTMENT (OUTPATIENT)
Dept: OPHTHALMOLOGY | Facility: CLINIC | Age: 89
End: 2024-09-09
Payer: MEDICARE

## 2024-09-09 DIAGNOSIS — H18.451 SALZMANN'S NODULAR DYSTROPHY OF RIGHT EYE: Primary | ICD-10-CM

## 2024-09-09 DIAGNOSIS — H52.11 MYOPIA OF RIGHT EYE WITH ASTIGMATISM AND PRESBYOPIA: ICD-10-CM

## 2024-09-09 DIAGNOSIS — H52.201 MYOPIA OF RIGHT EYE WITH ASTIGMATISM AND PRESBYOPIA: ICD-10-CM

## 2024-09-09 DIAGNOSIS — H40.10X2 ADVANCED OPEN-ANGLE GLAUCOMA, MODERATE STAGE: Primary | ICD-10-CM

## 2024-09-09 DIAGNOSIS — H52.4 MYOPIA OF RIGHT EYE WITH ASTIGMATISM AND PRESBYOPIA: ICD-10-CM

## 2024-09-09 DIAGNOSIS — H02.88B MEIBOMIAN GLAND DYSFUNCTION (MGD) OF UPPER AND LOWER EYELID OF LEFT EYE: ICD-10-CM

## 2024-09-09 DIAGNOSIS — H02.88A MEIBOMIAN GLAND DYSFUNCTION (MGD) OF UPPER AND LOWER EYELID OF RIGHT EYE: ICD-10-CM

## 2024-09-09 DIAGNOSIS — H18.422 BAND KERATOPATHY OF LEFT EYE: ICD-10-CM

## 2024-09-09 DIAGNOSIS — H18.513 FUCHS' CORNEAL DYSTROPHY OF BOTH EYES: ICD-10-CM

## 2024-09-09 DIAGNOSIS — H47.291 PARTIAL OPTIC ATROPHY OF RIGHT EYE: ICD-10-CM

## 2024-09-09 DIAGNOSIS — H16.223 KERATOCONJUNCTIVITIS SICCA OF BOTH EYES NOT SPECIFIED AS SJOGREN'S: ICD-10-CM

## 2024-09-09 PROCEDURE — 99214 OFFICE O/P EST MOD 30 MIN: CPT | Performed by: OPTOMETRIST

## 2024-09-09 PROCEDURE — 92015 DETERMINE REFRACTIVE STATE: CPT | Performed by: OPTOMETRIST

## 2024-09-09 RX ORDER — TIMOLOL MALEATE 5 MG/ML
1 SOLUTION/ DROPS OPHTHALMIC DAILY
Qty: 5 ML | Refills: 3 | Status: SHIPPED | OUTPATIENT
Start: 2024-09-09 | End: 2025-09-09

## 2024-09-09 RX ORDER — CARBOXYMETHYLCELLULOSE SODIUM 10 MG/ML
1 GEL OPHTHALMIC 2 TIMES DAILY
Qty: 60 EACH | Refills: 11 | Status: SHIPPED | OUTPATIENT
Start: 2024-09-09 | End: 2024-10-09

## 2024-09-09 ASSESSMENT — REFRACTION_WEARINGRX
OD_SPHERE: -1.25
OS_AXIS: 095
OS_SPHERE: PLANO
OS_CYLINDER: -3.00
OD_AXIS: 119
OD_ADD: +2.75
OS_ADD: +2.75
OD_CYLINDER: -2.75

## 2024-09-09 ASSESSMENT — CUP TO DISC RATIO: OS_RATIO: NO VIEW

## 2024-09-09 ASSESSMENT — ENCOUNTER SYMPTOMS
CARDIOVASCULAR NEGATIVE: 0
PSYCHIATRIC NEGATIVE: 0
GASTROINTESTINAL NEGATIVE: 0
HEMATOLOGIC/LYMPHATIC NEGATIVE: 0
RESPIRATORY NEGATIVE: 0
ENDOCRINE NEGATIVE: 0
ALLERGIC/IMMUNOLOGIC NEGATIVE: 0
NEUROLOGICAL NEGATIVE: 0
EYES NEGATIVE: 1
CONSTITUTIONAL NEGATIVE: 0
MUSCULOSKELETAL NEGATIVE: 0

## 2024-09-09 ASSESSMENT — TONOMETRY
OD_IOP_MMHG: 14
IOP_METHOD: GOLDMANN APPLANATION
OS_IOP_MMHG: 12

## 2024-09-09 ASSESSMENT — VISUAL ACUITY
OD_CC: 20/100+1
OS_SC: NLP
OD_CC: 20/60-2
METHOD: SNELLEN - LINEAR
CORRECTION_TYPE: GLASSES

## 2024-09-09 ASSESSMENT — REFRACTION_MANIFEST
OD_SPHERE: -1.00
OD_AXIS: 119
OD_ADD: +2.75
OD_CYLINDER: -2.50
OS_SPHERE: BALANCE

## 2024-09-09 ASSESSMENT — CONF VISUAL FIELD
OD_SUPERIOR_NASAL_RESTRICTION: 3
OS_INFERIOR_TEMPORAL_RESTRICTION: 1
OD_SUPERIOR_TEMPORAL_RESTRICTION: 3
OS_INFERIOR_NASAL_RESTRICTION: 1
OS_SUPERIOR_TEMPORAL_RESTRICTION: 1
OS_SUPERIOR_NASAL_RESTRICTION: 1

## 2024-09-09 ASSESSMENT — EXTERNAL EXAM - LEFT EYE: OS_EXAM: NORMAL

## 2024-09-09 ASSESSMENT — PACHYMETRY
OS_CT(UM): 584
OD_CT(UM): 530

## 2024-09-09 ASSESSMENT — SLIT LAMP EXAM - LIDS
COMMENTS: GOOD POSITION, 2+ MGD
COMMENTS: GOOD POSITION, 2+ MGD

## 2024-09-09 ASSESSMENT — EXTERNAL EXAM - RIGHT EYE: OD_EXAM: NORMAL

## 2024-09-09 NOTE — Clinical Note
Hello! Can you please call this patient's daughter (robel(?)) to schedule a follow up with Dr. Casey? She has been lost to f/up for several years. Her son seems to get a little confused that she can't see Dr. Lou for everything. I discussed at length with him today the importance of follow up w/ Dr. Casey. Thank you!

## 2024-09-09 NOTE — PROGRESS NOTES
Assessment/Plan   Diagnoses and all orders for this visit:    Salzmann's nodular dystrophy of right eye  Keratoconjunctivitis sicca of both eyes not specified as Sjogren's  -     carboxymethylcellulose (TheraTears) 1 % ophthalmic solution; Administer 1 drop into both eyes 2 times a day.  Meibomian gland dysfunction (MGD) of upper and lower eyelid of left eye  Meibomian gland dysfunction (MGD) of upper and lower eyelid of right eye  Continue w/ restasis bid OU, previously did not tolerate tyrvaya. Add ATs bid OU. Overall OSD is well controlled.     Fuchs' corneal dystrophy of both eyes  Band keratopathy of left eye  Grossly stable.     Partial optic atrophy of right eye  Can f/up w/ Dr. Lemus as scheduled but patient needs to re-establish care w/ Dr. Casey. Discussed at length with patient and her son the importance of seeing Dr. Casey for ongoing glaucoma care.     Myopia of right eye with astigmatism and presbyopia  New spec rx released today per patient request. Monitor 1 year or sooner prn. Refraction billed today.

## 2024-09-25 DIAGNOSIS — H16.223 KERATOCONJUNCTIVITIS SICCA OF BOTH EYES NOT DUE TO SJOGREN'S SYNDROME: ICD-10-CM

## 2024-09-25 RX ORDER — CYCLOSPORINE 0.5 MG/ML
1 EMULSION OPHTHALMIC EVERY 12 HOURS
Qty: 60 EACH | Refills: 2 | Status: SHIPPED | OUTPATIENT
Start: 2024-09-25

## 2024-10-23 ENCOUNTER — TELEPHONE (OUTPATIENT)
Dept: NEUROLOGY | Facility: CLINIC | Age: 89
End: 2024-10-23
Payer: MEDICARE

## 2024-11-07 DIAGNOSIS — Z00.00 HEALTHCARE MAINTENANCE: ICD-10-CM

## 2024-11-08 RX ORDER — LOPERAMIDE HYDROCHLORIDE 2 MG/1
CAPSULE ORAL
Qty: 630 CAPSULE | Refills: 1 | Status: SHIPPED | OUTPATIENT
Start: 2024-11-08

## 2024-11-14 ENCOUNTER — APPOINTMENT (OUTPATIENT)
Dept: OPHTHALMOLOGY | Facility: CLINIC | Age: 89
End: 2024-11-14
Payer: MEDICARE

## 2024-11-15 ENCOUNTER — LAB (OUTPATIENT)
Dept: LAB | Facility: LAB | Age: 89
End: 2024-11-15
Payer: MEDICARE

## 2024-11-15 ENCOUNTER — APPOINTMENT (OUTPATIENT)
Dept: PRIMARY CARE | Facility: CLINIC | Age: 89
End: 2024-11-15
Payer: MEDICARE

## 2024-11-15 VITALS
HEART RATE: 76 BPM | TEMPERATURE: 97.3 F | BODY MASS INDEX: 20.51 KG/M2 | WEIGHT: 115.8 LBS | OXYGEN SATURATION: 98 % | SYSTOLIC BLOOD PRESSURE: 150 MMHG | DIASTOLIC BLOOD PRESSURE: 68 MMHG | RESPIRATION RATE: 16 BRPM

## 2024-11-15 DIAGNOSIS — R51.9 NONINTRACTABLE HEADACHE, UNSPECIFIED CHRONICITY PATTERN, UNSPECIFIED HEADACHE TYPE: ICD-10-CM

## 2024-11-15 DIAGNOSIS — R41.3 MEMORY LOSS: ICD-10-CM

## 2024-11-15 DIAGNOSIS — M31.6 GIANT CELL ARTERITIS SYNDROME (MULTI): ICD-10-CM

## 2024-11-15 DIAGNOSIS — H47.291 PARTIAL OPTIC ATROPHY OF RIGHT EYE: ICD-10-CM

## 2024-11-15 DIAGNOSIS — M54.2 NECK PAIN: Primary | ICD-10-CM

## 2024-11-15 PROCEDURE — 85652 RBC SED RATE AUTOMATED: CPT

## 2024-11-15 PROCEDURE — 80053 COMPREHEN METABOLIC PANEL: CPT

## 2024-11-15 PROCEDURE — 36415 COLL VENOUS BLD VENIPUNCTURE: CPT

## 2024-11-15 PROCEDURE — 99214 OFFICE O/P EST MOD 30 MIN: CPT | Performed by: INTERNAL MEDICINE

## 2024-11-15 PROCEDURE — 1123F ACP DISCUSS/DSCN MKR DOCD: CPT | Performed by: INTERNAL MEDICINE

## 2024-11-15 PROCEDURE — 1158F ADVNC CARE PLAN TLK DOCD: CPT | Performed by: INTERNAL MEDICINE

## 2024-11-15 PROCEDURE — 1157F ADVNC CARE PLAN IN RCRD: CPT | Performed by: INTERNAL MEDICINE

## 2024-11-15 PROCEDURE — 1159F MED LIST DOCD IN RCRD: CPT | Performed by: INTERNAL MEDICINE

## 2024-11-15 PROCEDURE — 85025 COMPLETE CBC W/AUTO DIFF WBC: CPT

## 2024-11-15 PROCEDURE — 1160F RVW MEDS BY RX/DR IN RCRD: CPT | Performed by: INTERNAL MEDICINE

## 2024-11-15 PROCEDURE — 86140 C-REACTIVE PROTEIN: CPT

## 2024-11-15 PROCEDURE — 3078F DIAST BP <80 MM HG: CPT | Performed by: INTERNAL MEDICINE

## 2024-11-15 PROCEDURE — 3077F SYST BP >= 140 MM HG: CPT | Performed by: INTERNAL MEDICINE

## 2024-11-15 PROCEDURE — G2211 COMPLEX E/M VISIT ADD ON: HCPCS | Performed by: INTERNAL MEDICINE

## 2024-11-15 RX ORDER — DONEPEZIL HYDROCHLORIDE 5 MG/1
5 TABLET, FILM COATED ORAL NIGHTLY
Qty: 90 TABLET | Refills: 3 | Status: SHIPPED | OUTPATIENT
Start: 2024-11-15 | End: 2024-11-15

## 2024-11-15 RX ORDER — DONEPEZIL HYDROCHLORIDE 5 MG/1
5 TABLET, FILM COATED ORAL NIGHTLY
Qty: 90 TABLET | Refills: 3 | Status: SHIPPED | OUTPATIENT
Start: 2024-11-15 | End: 2025-05-14

## 2024-11-15 ASSESSMENT — PATIENT HEALTH QUESTIONNAIRE - PHQ9
1. LITTLE INTEREST OR PLEASURE IN DOING THINGS: MORE THAN HALF THE DAYS
2. FEELING DOWN, DEPRESSED OR HOPELESS: NOT AT ALL
SUM OF ALL RESPONSES TO PHQ9 QUESTIONS 1 AND 2: 2

## 2024-11-15 ASSESSMENT — ENCOUNTER SYMPTOMS: HEADACHES: 1

## 2024-11-15 NOTE — PROGRESS NOTES
Patient here for a follow up and to discuss labs.     Subjective   Patient ID: Heather Villalba is a 89 y.o. female who presents for Follow-up.  She is accompanied by her daughter who offers some of the history.    The patient mentions ongoing intermittent left-sided cervicogenic headache.  She is currently maintained with nortriptyline 10mg once nightly, and finds that this provides partial relief.  The patient was previously following with Physical Therapy, and inquires about a new order for additional sessions.  She is currently following with  from Neurology.    The patient is concerned about ongoing difficulty with short term memory loss, particularly with misplacing items.      The patient has been following with  from Optometry.  She was advised to re-establish care with  from Ophthalmology for management of glaucoma.    The patient is adjusting well to her new Residence, and states that she has been feeling more social than in the past.      Review of Systems   Neurological:  Positive for headaches.        Positive for difficulty with memory.       Objective   Physical Exam  Constitutional:       Appearance: Normal appearance.   Neck:      Vascular: No carotid bruit.   Cardiovascular:      Rate and Rhythm: Normal rate and regular rhythm.      Heart sounds: Normal heart sounds.   Pulmonary:      Effort: Pulmonary effort is normal.      Breath sounds: Normal breath sounds.   Abdominal:      General: Bowel sounds are normal.      Palpations: Abdomen is soft.      Tenderness: There is no abdominal tenderness.   Skin:     General: Skin is warm and dry.   Neurological:      General: No focal deficit present.      Mental Status: She is alert and oriented to person, place, and time. Mental status is at baseline.   Psychiatric:         Mood and Affect: Mood normal.         Behavior: Behavior normal.       Assessment/Plan   Problem List Items Addressed This Visit             ICD-10-CM    Neck  pain - Primary M54.2    Relevant Orders    Referral to Physical Therapy     Other Visit Diagnoses         Codes    Nonintractable headache, unspecified chronicity pattern, unspecified headache type     R51.9    Relevant Orders    Referral to Physical Therapy    Memory loss     R41.3    Relevant Medications    donepezil (Aricept) 5 mg tablet            IMPRESSIONS/PLAN:         Cervicogenic Headache/Neck Pain  - Patient MUST receive nortriptyline 10mg once nightly as prescribed.  Ordered referral to Physical Therapy as this has been helpful in the past.  Following with  from Neurology.  Call the clinic if symptoms persist or worsen.     Memory Loss  - IO MMSE 26/30 per 5/31/2024.  Worsening per patient.  Prescribed donepezil 5mg once nightly.  Discussed adverse effect profile.  Advised patient to try audio books as vision prevents her from doing crossword puzzles.  Previously Increased Vitamin B12 to 5000 mcg once daily.  Call the clinic if symptoms persist or worsen.     /68 in the office today.      Depression   - Stable. Patient taking escitalopram 10 mg one tablets every day.    Cervicogenic Headache  - Patient MUST receive nortriptyline 10mg once nightly as prescribed.  Call the clinic if symptoms persist or worsen.     Glaucoma  - Patient MUST take latanoprost 0.005% ophthalmic solution as 1 drop in each eye nightly as prescribed.  She also should be taking her cyclosporine drops as prescribed.     Neck Pain   - I have advised the patient it is okay to take Tylenol up to a maximum dosage of 3000 mg. Have recommended a regimen of two 325mg tablets in the morning and two tablets at night as needed. The patient will continue with physical therapy.    Osteoporosis   - DEXA performed 2/1/2022 revealed T-score of -3.8 indicative of osteoporosis. Follows with Dr. Carranza. Previously on Alendronate 70 mg weekly.        Hypercalcemia   - Calcium returned to wnl of 10.3 per 5/2022 CMP. elevated at 10.4 per  renal function panel 12/2021, slightly decreased from 10.8 - 9/2021. Follows up with Dr. Ulloa from General Surgery for parathyroid adenoma. Will monitor for now.      Anemia   - Last Hb 11.7 per 6/2023, trending up.  Folate, Ferritin, Iron and TIBC all wnl.      Health Maintenance   - Routine labs 2/2024.  Routine labs also pending.     Follow up in 3-6 months, call sooner if needed.       Scribe Attestation  By signing my name below, I, Dimas Chery   attest that this documentation has been prepared under the direction and in the presence of Jone Yo DO.   Alyce Zhao 11/15/24 1:20 PM

## 2024-11-16 LAB
ALBUMIN SERPL BCP-MCNC: 4.3 G/DL (ref 3.4–5)
ALP SERPL-CCNC: 64 U/L (ref 33–136)
ALT SERPL W P-5'-P-CCNC: 12 U/L (ref 7–45)
ANION GAP SERPL CALC-SCNC: 12 MMOL/L (ref 10–20)
AST SERPL W P-5'-P-CCNC: 17 U/L (ref 9–39)
BASOPHILS # BLD AUTO: 0.04 X10*3/UL (ref 0–0.1)
BASOPHILS NFR BLD AUTO: 0.6 %
BILIRUB SERPL-MCNC: 0.6 MG/DL (ref 0–1.2)
BUN SERPL-MCNC: 25 MG/DL (ref 6–23)
CALCIUM SERPL-MCNC: 10.5 MG/DL (ref 8.6–10.6)
CHLORIDE SERPL-SCNC: 102 MMOL/L (ref 98–107)
CO2 SERPL-SCNC: 28 MMOL/L (ref 21–32)
CREAT SERPL-MCNC: 1 MG/DL (ref 0.5–1.05)
CRP SERPL-MCNC: 0.1 MG/DL
EGFRCR SERPLBLD CKD-EPI 2021: 52 ML/MIN/1.73M*2
EOSINOPHIL # BLD AUTO: 0.17 X10*3/UL (ref 0–0.4)
EOSINOPHIL NFR BLD AUTO: 2.7 %
ERYTHROCYTE [DISTWIDTH] IN BLOOD BY AUTOMATED COUNT: 13.5 % (ref 11.5–14.5)
ERYTHROCYTE [SEDIMENTATION RATE] IN BLOOD BY WESTERGREN METHOD: 15 MM/H (ref 0–30)
GLUCOSE SERPL-MCNC: 137 MG/DL (ref 74–99)
HCT VFR BLD AUTO: 38.4 % (ref 36–46)
HGB BLD-MCNC: 11.9 G/DL (ref 12–16)
IMM GRANULOCYTES # BLD AUTO: 0.02 X10*3/UL (ref 0–0.5)
IMM GRANULOCYTES NFR BLD AUTO: 0.3 % (ref 0–0.9)
LYMPHOCYTES # BLD AUTO: 1.51 X10*3/UL (ref 0.8–3)
LYMPHOCYTES NFR BLD AUTO: 24.2 %
MCH RBC QN AUTO: 30.2 PG (ref 26–34)
MCHC RBC AUTO-ENTMCNC: 31 G/DL (ref 32–36)
MCV RBC AUTO: 98 FL (ref 80–100)
MONOCYTES # BLD AUTO: 0.55 X10*3/UL (ref 0.05–0.8)
MONOCYTES NFR BLD AUTO: 8.8 %
NEUTROPHILS # BLD AUTO: 3.96 X10*3/UL (ref 1.6–5.5)
NEUTROPHILS NFR BLD AUTO: 63.4 %
NRBC BLD-RTO: 0 /100 WBCS (ref 0–0)
PLATELET # BLD AUTO: 185 X10*3/UL (ref 150–450)
POTASSIUM SERPL-SCNC: 4.1 MMOL/L (ref 3.5–5.3)
PROT SERPL-MCNC: 7 G/DL (ref 6.4–8.2)
RBC # BLD AUTO: 3.94 X10*6/UL (ref 4–5.2)
SODIUM SERPL-SCNC: 138 MMOL/L (ref 136–145)
WBC # BLD AUTO: 6.3 X10*3/UL (ref 4.4–11.3)

## 2024-12-04 ENCOUNTER — TELEPHONE (OUTPATIENT)
Dept: PRIMARY CARE | Facility: CLINIC | Age: 89
End: 2024-12-04
Payer: MEDICARE

## 2024-12-04 NOTE — TELEPHONE ENCOUNTER
Pt son called - pt is at [facility] - pt has medication charted for tylenol 600mg - however they can not find 600mg in stores  - he can get 500mg - wants to know if Dr Yo can update her med list for 500mg  and send to [facility] ? Please advise

## 2024-12-04 NOTE — TELEPHONE ENCOUNTER
Gave verbal order for [facility] to change tylenol from 600mg to 500mg same directions - ok per Dr Yo

## 2024-12-31 DIAGNOSIS — H16.223 KERATOCONJUNCTIVITIS SICCA OF BOTH EYES NOT DUE TO SJOGREN'S SYNDROME: ICD-10-CM

## 2024-12-31 RX ORDER — CYCLOSPORINE 0.5 MG/ML
1 EMULSION OPHTHALMIC EVERY 12 HOURS
Qty: 180 EACH | Refills: 3 | Status: SHIPPED | OUTPATIENT
Start: 2024-12-31 | End: 2025-12-31

## 2025-01-15 ENCOUNTER — APPOINTMENT (OUTPATIENT)
Dept: OPHTHALMOLOGY | Facility: CLINIC | Age: OVER 89
End: 2025-01-15
Payer: MEDICARE

## 2025-01-20 ENCOUNTER — APPOINTMENT (OUTPATIENT)
Dept: OPHTHALMOLOGY | Facility: CLINIC | Age: OVER 89
End: 2025-01-20
Payer: MEDICARE

## 2025-02-14 ENCOUNTER — APPOINTMENT (OUTPATIENT)
Dept: PRIMARY CARE | Facility: CLINIC | Age: OVER 89
End: 2025-02-14
Payer: MEDICARE

## 2025-06-20 ENCOUNTER — APPOINTMENT (OUTPATIENT)
Dept: OPHTHALMOLOGY | Facility: CLINIC | Age: OVER 89
End: 2025-06-20
Payer: MEDICARE

## 2025-06-20 DIAGNOSIS — H18.513 FUCHS' CORNEAL DYSTROPHY OF BOTH EYES: Primary | ICD-10-CM

## 2025-06-20 DIAGNOSIS — H16.223 KERATOCONJUNCTIVITIS SICCA OF BOTH EYES NOT DUE TO SJOGREN'S SYNDROME: ICD-10-CM

## 2025-06-20 PROCEDURE — 99213 OFFICE O/P EST LOW 20 MIN: CPT | Performed by: OPHTHALMOLOGY

## 2025-06-20 RX ORDER — AMLODIPINE BESYLATE 2.5 MG
2.5 TABLET ORAL
COMMUNITY
Start: 2025-04-29

## 2025-06-20 RX ORDER — CYCLOSPORINE 0.5 MG/ML
1 EMULSION OPHTHALMIC EVERY 12 HOURS
Qty: 180 EACH | Refills: 3 | Status: SHIPPED | OUTPATIENT
Start: 2025-06-20 | End: 2026-06-20

## 2025-06-20 RX ORDER — LORATADINE 10 MG/1
10 TABLET ORAL
COMMUNITY
Start: 2025-04-26

## 2025-06-20 ASSESSMENT — VISUAL ACUITY
OD_CC: 20/60
METHOD: SNELLEN - LINEAR
OD_PH_CC: 20/50

## 2025-06-20 ASSESSMENT — ENCOUNTER SYMPTOMS
CARDIOVASCULAR NEGATIVE: 0
PSYCHIATRIC NEGATIVE: 0
MUSCULOSKELETAL NEGATIVE: 0
CONSTITUTIONAL NEGATIVE: 0
RESPIRATORY NEGATIVE: 0
EYES NEGATIVE: 0
ALLERGIC/IMMUNOLOGIC NEGATIVE: 0
ENDOCRINE NEGATIVE: 0
GASTROINTESTINAL NEGATIVE: 0
NEUROLOGICAL NEGATIVE: 0
HEMATOLOGIC/LYMPHATIC NEGATIVE: 0

## 2025-06-20 ASSESSMENT — SLIT LAMP EXAM - LIDS
COMMENTS: GOOD POSITION, 2+ MGD
COMMENTS: GOOD POSITION, 2+ MGD

## 2025-06-20 ASSESSMENT — CUP TO DISC RATIO: OS_RATIO: NO VIEW

## 2025-06-20 ASSESSMENT — PACHYMETRY
OS_CT(UM): 584
OD_CT(UM): 552

## 2025-06-20 ASSESSMENT — EXTERNAL EXAM - LEFT EYE: OS_EXAM: NORMAL

## 2025-06-20 ASSESSMENT — TONOMETRY
OS_IOP_MMHG: 6
OD_IOP_MMHG: 14
IOP_METHOD: GOLDMANN APPLANATION

## 2025-06-20 ASSESSMENT — EXTERNAL EXAM - RIGHT EYE: OD_EXAM: NORMAL

## 2025-06-20 NOTE — PROGRESS NOTES
Assessment/Plan   Diagnoses and all orders for this visit:  Keratoconjunctivitis sicca of both eyes not due to Sjogren's syndrome  -     lubricating eye drops ophthalmic solution; Administer 1 drop into both eyes 4 times a day.  -     cycloSPORINE (Restasis) 0.05 % ophthalmic emulsion; Administer 1 drop into both eyes every 12 hours.     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 Lou note, not painful.      Plan    Increase Ats to QID OU  Keep Restasis BID

## 2026-01-14 ENCOUNTER — APPOINTMENT (OUTPATIENT)
Dept: OPHTHALMOLOGY | Facility: CLINIC | Age: OVER 89
End: 2026-01-14
Payer: MEDICARE